# Patient Record
Sex: MALE | Race: WHITE | NOT HISPANIC OR LATINO | Employment: PART TIME | URBAN - METROPOLITAN AREA
[De-identification: names, ages, dates, MRNs, and addresses within clinical notes are randomized per-mention and may not be internally consistent; named-entity substitution may affect disease eponyms.]

---

## 2017-01-03 ENCOUNTER — LAB (OUTPATIENT)
Dept: LAB | Facility: HOSPITAL | Age: 22
End: 2017-01-03

## 2017-01-03 DIAGNOSIS — R53.83 FATIGUE, UNSPECIFIED TYPE: Primary | ICD-10-CM

## 2017-01-03 LAB
25(OH)D3 SERPL-MCNC: 11.4 NG/ML
ALBUMIN SERPL-MCNC: 4.9 G/DL (ref 3.2–4.8)
ALBUMIN/GLOB SERPL: 1.8 G/DL (ref 1.5–2.5)
ALP SERPL-CCNC: 64 U/L (ref 25–100)
ALT SERPL W P-5'-P-CCNC: 26 U/L (ref 7–40)
ANION GAP SERPL CALCULATED.3IONS-SCNC: 12 MMOL/L (ref 3–11)
AST SERPL-CCNC: 22 U/L (ref 0–33)
BASOPHILS # BLD MANUAL: 0.07 10*3/MM3 (ref 0–0.2)
BASOPHILS NFR BLD AUTO: 1 % (ref 0–1)
BILIRUB SERPL-MCNC: 0.6 MG/DL (ref 0.3–1.2)
BUN BLD-MCNC: 14 MG/DL (ref 9–23)
BUN/CREAT SERPL: 15.6 (ref 7–25)
CALCIUM SPEC-SCNC: 10.6 MG/DL (ref 8.7–10.4)
CHLORIDE SERPL-SCNC: 101 MMOL/L (ref 99–109)
CO2 SERPL-SCNC: 28 MMOL/L (ref 20–31)
CREAT BLD-MCNC: 0.9 MG/DL (ref 0.6–1.3)
DEPRECATED RDW RBC AUTO: 40.2 FL (ref 37–54)
EOSINOPHIL # BLD MANUAL: 0 10*3/MM3 (ref 0.1–0.3)
EOSINOPHIL NFR BLD MANUAL: 0 % (ref 0–3)
ERYTHROCYTE [DISTWIDTH] IN BLOOD BY AUTOMATED COUNT: 12.3 % (ref 11.3–14.5)
ERYTHROCYTE [SEDIMENTATION RATE] IN BLOOD: 2 MM/HR (ref 0–15)
GFR SERPL CREATININE-BSD FRML MDRD: 108 ML/MIN/1.73
GLOBULIN UR ELPH-MCNC: 2.7 GM/DL
GLUCOSE BLD-MCNC: 74 MG/DL (ref 70–100)
HCT VFR BLD AUTO: 49.9 % (ref 38.9–50.9)
HGB BLD-MCNC: 16.9 G/DL (ref 13.1–17.5)
LYMPHOCYTES # BLD MANUAL: 2.06 10*3/MM3 (ref 0.6–4.8)
LYMPHOCYTES NFR BLD MANUAL: 1 % (ref 0–12)
LYMPHOCYTES NFR BLD MANUAL: 28 % (ref 24–44)
MCH RBC QN AUTO: 30.3 PG (ref 27–31)
MCHC RBC AUTO-ENTMCNC: 33.9 G/DL (ref 32–36)
MCV RBC AUTO: 89.4 FL (ref 80–99)
MONOCYTES # BLD AUTO: 0.07 10*3/MM3 (ref 0–1)
NEUTROPHILS # BLD AUTO: 5.15 10*3/MM3 (ref 1.5–8.3)
NEUTROPHILS NFR BLD MANUAL: 67 % (ref 41–71)
NEUTS BAND NFR BLD MANUAL: 3 % (ref 0–5)
PLAT MORPH BLD: NORMAL
PLATELET # BLD AUTO: 220 10*3/MM3 (ref 150–450)
PMV BLD AUTO: 10.9 FL (ref 6–12)
POTASSIUM BLD-SCNC: 4.5 MMOL/L (ref 3.5–5.5)
PROT SERPL-MCNC: 7.6 G/DL (ref 5.7–8.2)
RBC # BLD AUTO: 5.58 10*6/MM3 (ref 4.2–5.76)
RBC MORPH BLD: NORMAL
SODIUM BLD-SCNC: 141 MMOL/L (ref 132–146)
T4 FREE SERPL-MCNC: 1.36 NG/DL (ref 0.89–1.76)
TSH SERPL DL<=0.05 MIU/L-ACNC: 1.59 MIU/ML (ref 0.35–5.35)
WBC MORPH BLD: NORMAL
WBC NRBC COR # BLD: 7.35 10*3/MM3 (ref 4.5–13.5)

## 2017-01-03 PROCEDURE — 84439 ASSAY OF FREE THYROXINE: CPT | Performed by: PEDIATRICS

## 2017-01-03 PROCEDURE — 85007 BL SMEAR W/DIFF WBC COUNT: CPT | Performed by: PEDIATRICS

## 2017-01-03 PROCEDURE — 82306 VITAMIN D 25 HYDROXY: CPT | Performed by: PEDIATRICS

## 2017-01-03 PROCEDURE — 85652 RBC SED RATE AUTOMATED: CPT | Performed by: PEDIATRICS

## 2017-01-03 PROCEDURE — 84443 ASSAY THYROID STIM HORMONE: CPT | Performed by: PEDIATRICS

## 2017-01-03 PROCEDURE — 80053 COMPREHEN METABOLIC PANEL: CPT | Performed by: PEDIATRICS

## 2017-01-03 PROCEDURE — 36415 COLL VENOUS BLD VENIPUNCTURE: CPT

## 2017-01-03 PROCEDURE — 85027 COMPLETE CBC AUTOMATED: CPT | Performed by: PEDIATRICS

## 2023-07-25 ENCOUNTER — NURSE TRIAGE (OUTPATIENT)
Dept: CALL CENTER | Facility: HOSPITAL | Age: 28
End: 2023-07-25
Payer: COMMERCIAL

## 2023-07-25 NOTE — TELEPHONE ENCOUNTER
HUB-He will be keeping his office visit time on Friday. He is in law school. He is not having any major problems If he gets headache worse Ha every had or vision changes need to be seen in the ER. He will be a new patient to the office.

## 2023-07-25 NOTE — TELEPHONE ENCOUNTER
HUB-He will be keeping his office visit time on Friday. He is in law school. He is not having any major problems If he gets headache worse Ha every had or vision changes need to be seen in the ER. He will be a new patient to the office.    Reason for Disposition   [1] Systolic BP  >= 130 OR Diastolic >= 80 AND [2] taking BP medications    Additional Information   Negative: Difficult to awaken or acting confused (e.g., disoriented, slurred speech)   Negative: SEVERE difficulty breathing (e.g., struggling for each breath, speaks in single words)   Negative: [1] Weakness of the face, arm or leg on one side of the body AND [2] new-onset   Negative: [1] Numbness (i.e., loss of sensation) of the face, arm or leg on one side of the body AND [2] new-onset   Negative: [1] Chest pain lasts > 5 minutes AND [2] history of heart disease (i.e., heart attack, bypass surgery, angina, angioplasty, CHF)   Negative: [1] Chest pain AND [2] took nitrogylcerin AND [3] pain was not relieved   Negative: Sounds like a life-threatening emergency to the triager   Negative: Symptom is main concern (e.g., headache, chest pain)   Negative: Low blood pressure is main concern   Negative: [1] Systolic BP  >= 160 OR Diastolic >= 100 AND [2] cardiac (e.g., breathing difficulty, chest pain) or neurologic symptoms (e.g., new-onset blurred or double vision, unsteady gait)   Negative: [1] Pregnant 20 or more weeks (or postpartum < 6 weeks) AND [2] new hand or face swelling   Negative: [1] Pregnant 20 or more weeks (or postpartum < 6 weeks) AND [2] Systolic BP >= 160 OR Diastolic >= 110   Negative: [1] Systolic BP  >= 200 OR Diastolic >= 120 AND [2] having NO cardiac or neurologic symptoms   Negative: [1] Pregnant 20 or more weeks (or postpartum < 6 weeks) AND [2] Systolic BP  >= 140 OR Diastolic >= 90   Negative: [1] Systolic BP  >= 180 OR Diastolic >= 110 AND [2] missed most recent dose of blood pressure medication   Negative: Systolic BP  >= 180 OR  Diastolic >= 110   Negative: Ran out of BP medications   Negative: Systolic BP  >= 160 OR Diastolic >= 100   Negative: [1] Taking BP medications AND [2] feels is having side effects (e.g., impotence, cough, dizzy upon standing)   Negative: [1] Systolic BP  >= 130 OR Diastolic >= 80 AND [2] pregnant   Negative: [1] Systolic BP  >= 130 OR Diastolic >= 80 AND [2] not taking BP medications    Protocols used: Blood Pressure - High-ADULT-

## 2023-07-28 ENCOUNTER — OFFICE VISIT (OUTPATIENT)
Dept: FAMILY MEDICINE CLINIC | Facility: CLINIC | Age: 28
End: 2023-07-28
Payer: COMMERCIAL

## 2023-07-28 VITALS
TEMPERATURE: 98.2 F | HEIGHT: 71 IN | HEART RATE: 78 BPM | SYSTOLIC BLOOD PRESSURE: 128 MMHG | WEIGHT: 237.4 LBS | DIASTOLIC BLOOD PRESSURE: 98 MMHG | BODY MASS INDEX: 33.23 KG/M2

## 2023-07-28 DIAGNOSIS — Z86.59 HISTORY OF OCD (OBSESSIVE COMPULSIVE DISORDER): ICD-10-CM

## 2023-07-28 DIAGNOSIS — N50.819 TESTICULAR DISCOMFORT: ICD-10-CM

## 2023-07-28 DIAGNOSIS — Z00.00 ENCOUNTER FOR ANNUAL PHYSICAL EXAM: Primary | ICD-10-CM

## 2023-07-28 DIAGNOSIS — F90.0 ATTENTION DEFICIT HYPERACTIVITY DISORDER, PREDOMINANTLY INATTENTIVE TYPE: ICD-10-CM

## 2023-07-28 DIAGNOSIS — Z83.3 FAMILY HISTORY OF DIABETES MELLITUS: ICD-10-CM

## 2023-07-28 PROBLEM — F41.9 ANXIETY DISORDER: Status: ACTIVE | Noted: 2017-08-08

## 2023-07-28 PROCEDURE — 1160F RVW MEDS BY RX/DR IN RCRD: CPT | Performed by: FAMILY MEDICINE

## 2023-07-28 PROCEDURE — 99385 PREV VISIT NEW AGE 18-39: CPT | Performed by: FAMILY MEDICINE

## 2023-07-28 PROCEDURE — 2014F MENTAL STATUS ASSESS: CPT | Performed by: FAMILY MEDICINE

## 2023-07-28 PROCEDURE — 3008F BODY MASS INDEX DOCD: CPT | Performed by: FAMILY MEDICINE

## 2023-07-28 PROCEDURE — 1159F MED LIST DOCD IN RCRD: CPT | Performed by: FAMILY MEDICINE

## 2023-07-28 NOTE — ASSESSMENT & PLAN NOTE
Annual wellness exam completed today. Health Maintenance including immunizations was updated and reflected in the chart. Yearly screening labs were ordered.     Further recommendations to be given once lab data received.     Health advice: healthy food choices with fresh fruits and vegetables, maintain sleep pattern at least 8 hours, avoid texting and distracted driving practices; wear safety belt, engage in regular exercise, maintain healthy weight, use safe sex practices, avoid alcohol and illicit drugs. Maintain immunizations that are up to date. Maintain health maintenance.  Follow up with PCP if struggling with depression or anxiety. Keep regular dental and eye exams. Brush and floss teeth daily.     F/U annually and prn.

## 2023-07-28 NOTE — ASSESSMENT & PLAN NOTE
Psychological condition is  stable .  Patient is currently not in school at this time, I will have patient follow-up in 3 weeks to recheck his blood pressure.  If his blood pressure is still stable I will restart him on a stimulant  Psychological condition  will be reassessed  3 weeks .

## 2023-07-28 NOTE — PROGRESS NOTES
Subjective     Chief Complaint  Hypertension (Est care/BP has been running high for approx 1 yr/Dm is in family would like to be tested/Found a lump on L testicle)    Subjective          Yakelin Enrique is a 28 y.o. male who presents today to [facility] for initial evaluation .    HPI:   Hypertension    Testicle Pain  The patient's primary symptoms include testicular pain. This is a new problem. Episode onset: 6 months. The problem occurs intermittently. The problem has been waxing and waning. The patient is experiencing no pain.     Ms. Enrique is a very pleasant 28 year old male who presents today to establish care with a primary care physician and annual physical exam.     He has a history of hypertension, that has been ongoing for the last one year. He has been trying to live a healthier lifestyle.  He reports that he has been running and exercising more and has noticed a 10 pound weight loss.  Blood pressure in office today is well controlled at 128/98.  He also reports concern as he has a family history of type 2 diabetes.    Otherwise his past medical history includes ADHD and OCD.  He is not currently taking any medications for these as he wished to taper off of sertraline as he felt like this was causing some weight gain.  He is doing moderately well without medication.  His ADHD medicine was discontinued due to elevation in his blood pressure.  He reports that after he was taken off of his stimulant he noticed a decline in his grades.  He is currently in law school.      The following portions of the patient's history were reviewed and updated as appropriate: allergies, current medications, past family history, past medical history, past social history, past surgical history and problem list.    Objective     Objective     Allergy:   Allergies   Allergen Reactions    Sulfa Antibiotics         Current Medications:   No current outpatient medications on file.     No  "current facility-administered medications for this visit.       Past Medical History:  Past Medical History:   Diagnosis Date    ADHD     OCD (obsessive compulsive disorder)        Past Surgical History:  Past Surgical History:   Procedure Laterality Date    CLOSED REDUCTION ELBOW DISLOCATION Bilateral     DENTAL PROCEDURE         Social History:  Social History     Socioeconomic History    Marital status: Single   Tobacco Use    Smoking status: Never     Passive exposure: Never    Smokeless tobacco: Never   Vaping Use    Vaping Use: Never used   Substance and Sexual Activity    Alcohol use: Yes     Comment: socially    Drug use: Defer    Sexual activity: Defer       Family History:  Family History   Problem Relation Age of Onset    Diabetes Mother     Diabetes Brother     Diabetes Maternal Aunt     Diabetes Cousin        Vital Signs:   /98   Pulse 78   Temp 98.2 °F (36.8 °C) (Infrared)   Ht 179.1 cm (70.51\")   Wt 108 kg (237 lb 6.4 oz)   BMI 33.57 kg/m²      Physical Exam:  Physical Exam  Constitutional:       Appearance: He is normal weight.   HENT:      Head: Normocephalic.   Cardiovascular:      Rate and Rhythm: Normal rate and regular rhythm.      Pulses: Normal pulses.      Heart sounds: No murmur heard.  Pulmonary:      Effort: Pulmonary effort is normal. No respiratory distress.      Breath sounds: No wheezing.   Abdominal:      General: Abdomen is flat. Bowel sounds are normal.   Musculoskeletal:         General: No swelling or tenderness.      Cervical back: Normal range of motion.   Neurological:      General: No focal deficit present.      Mental Status: He is alert. Mental status is at baseline.   Psychiatric:         Mood and Affect: Mood normal.         Behavior: Behavior normal.         Thought Content: Thought content normal.         Judgment: Judgment normal.             PHQ-9 Score  PHQ-9 Total Score: 0     Lab Review  No visits with results within 3 Month(s) from this visit.   Latest " known visit with results is:   Lab on 01/03/2017   Component Date Value Ref Range Status    Glucose 01/03/2017 74  70 - 100 mg/dL Final    BUN 01/03/2017 14  9 - 23 mg/dL Final    Creatinine 01/03/2017 0.90  0.60 - 1.30 mg/dL Final    Sodium 01/03/2017 141  132 - 146 mmol/L Final    Potassium 01/03/2017 4.5  3.5 - 5.5 mmol/L Final    Chloride 01/03/2017 101  99 - 109 mmol/L Final    CO2 01/03/2017 28.0  20.0 - 31.0 mmol/L Final    Calcium 01/03/2017 10.6 (H)  8.7 - 10.4 mg/dL Final    Total Protein 01/03/2017 7.6  5.7 - 8.2 g/dL Final    Albumin 01/03/2017 4.90 (H)  3.20 - 4.80 g/dL Final    ALT (SGPT) 01/03/2017 26  7 - 40 U/L Final    AST (SGOT) 01/03/2017 22  0 - 33 U/L Final    Alkaline Phosphatase 01/03/2017 64  25 - 100 U/L Final    Total Bilirubin 01/03/2017 0.6  0.3 - 1.2 mg/dL Final    eGFR Non African Amer 01/03/2017 108  >60 mL/min/1.73 Final    Globulin 01/03/2017 2.7  gm/dL Final    A/G Ratio 01/03/2017 1.8  1.5 - 2.5 g/dL Final    BUN/Creatinine Ratio 01/03/2017 15.6  7.0 - 25.0 Final    Anion Gap 01/03/2017 12.0 (H)  3.0 - 11.0 mmol/L Final    Sed Rate 01/03/2017 2  0 - 15 mm/hr Final    25 Hydroxy, Vitamin D 01/03/2017 11.4  ng/ml Final    TSH 01/03/2017 1.589  0.350 - 5.350 mIU/mL Final    Free T4 01/03/2017 1.36  0.89 - 1.76 ng/dL Final    WBC 01/03/2017 7.35  4.50 - 13.50 10*3/mm3 Final    RBC 01/03/2017 5.58  4.20 - 5.76 10*6/mm3 Final    Hemoglobin 01/03/2017 16.9  13.1 - 17.5 g/dL Final    Hematocrit 01/03/2017 49.9  38.9 - 50.9 % Final    MCV 01/03/2017 89.4  80.0 - 99.0 fL Final    MCH 01/03/2017 30.3  27.0 - 31.0 pg Final    MCHC 01/03/2017 33.9  32.0 - 36.0 g/dL Final    RDW 01/03/2017 12.3  11.3 - 14.5 % Final    RDW-SD 01/03/2017 40.2  37.0 - 54.0 fl Final    MPV 01/03/2017 10.9  6.0 - 12.0 fL Final    Platelets 01/03/2017 220  150 - 450 10*3/mm3 Final    Neutrophil % 01/03/2017 67.0  41.0 - 71.0 % Final    Lymphocyte % 01/03/2017 28.0  24.0 - 44.0 % Final    Monocyte % 01/03/2017 1.0   0.0 - 12.0 % Final    Eosinophil % 01/03/2017 0.0  0.0 - 3.0 % Final    Basophil % 01/03/2017 1.0  0.0 - 1.0 % Final    Bands %  01/03/2017 3.0  0.0 - 5.0 % Final    Neutrophils Absolute 01/03/2017 5.15  1.50 - 8.30 10*3/mm3 Final    Lymphocytes Absolute 01/03/2017 2.06  0.60 - 4.80 10*3/mm3 Final    Monocytes Absolute 01/03/2017 0.07  0.00 - 1.00 10*3/mm3 Final    Eosinophils Absolute 01/03/2017 0.00 (L)  0.10 - 0.30 10*3/mm3 Final    Basophils Absolute 01/03/2017 0.07  0.00 - 0.20 10*3/mm3 Final    RBC Morphology 01/03/2017 Normal  Normal Final    WBC Morphology 01/03/2017 Normal  Normal Final    Platelet Morphology 01/03/2017 Normal  Normal Final        Radiology Results        Assessment / Plan         Assessment and Plan   Diagnoses and all orders for this visit:    1. Encounter for annual physical exam (Primary)  Assessment & Plan:  Annual wellness exam completed today. Health Maintenance including immunizations was updated and reflected in the chart. Yearly screening labs were ordered.     Further recommendations to be given once lab data received.     Health advice: healthy food choices with fresh fruits and vegetables, maintain sleep pattern at least 8 hours, avoid texting and distracted driving practices; wear safety belt, engage in regular exercise, maintain healthy weight, use safe sex practices, avoid alcohol and illicit drugs. Maintain immunizations that are up to date. Maintain health maintenance.  Follow up with PCP if struggling with depression or anxiety. Keep regular dental and eye exams. Brush and floss teeth daily.     F/U annually and prn.       Orders:  -     CBC & Differential; Future  -     Comprehensive Metabolic Panel; Future  -     Lipid Panel; Future  -     Hemoglobin A1c; Future  -     TSH Rfx On Abnormal To Free T4; Future    2. Family history of diabetes mellitus  -     Hemoglobin A1c; Future    3. Attention deficit hyperactivity disorder, predominantly inattentive type  Assessment &  Plan:  Psychological condition is  stable .  Patient is currently not in school at this time, I will have patient follow-up in 3 weeks to recheck his blood pressure.  If his blood pressure is still stable I will restart him on a stimulant  Psychological condition  will be reassessed  3 weeks .      4. History of OCD (obsessive compulsive disorder)  Assessment & Plan:  As in HPI he is controlling his symptoms with behavioral therapies      5. Testicular discomfort  Assessment & Plan:  Completely benign exam performed by my partner, Dr. Walsh.  I appreciate his assistance in this.         Discussed possible differential diagnoses, testing, treatment, recommended non-pharmacological interventions, risks, warning signs to monitor for that would indicate need for follow-up in clinic or ER. If no improvement with these regimens or you have new or worsening symptoms follow-up. Patient verbalizes understanding and agreement with plan of care. Denies further needs or concerns.     Patient was given instructions and counseling regarding her condition and for health maintenance advised.    BMI is >= 30 and <35. (Class 1 Obesity). The following options were offered after discussion;: weight loss educational material (shared in after visit summary)         Health Maintenance  Health Maintenance:   Health Maintenance Due   Topic Date Due    HEPATITIS C SCREENING  Never done        Meds ordered during this visit  No orders of the defined types were placed in this encounter.      Meds stopped during this visit:  Medications Discontinued During This Encounter   Medication Reason    azithromycin (ZITHROMAX) 250 MG tablet *Therapy completed        Visit Diagnoses    ICD-10-CM ICD-9-CM   1. Encounter for annual physical exam  Z00.00 V70.0   2. Family history of diabetes mellitus  Z83.3 V18.0   3. Attention deficit hyperactivity disorder, predominantly inattentive type  F90.0 314.00   4. History of OCD (obsessive compulsive disorder)   Z86.59 V11.2   5. Testicular discomfort  N50.819 608.9       Patient was given instructions and counseling regarding his condition or for health maintenance advice. Please see specific information pulled into the AVS if appropriate.     Follow Up   Return in about 3 weeks (around 8/18/2023) for Recheck.          This document has been electronically signed by Marija Corral DO   July 28, 2023 14:25 EDT    Dictated Utilizing Dragon Dictation: Part of this note may be an electronic transcription/translation of spoken language to printed text using the Dragon Dictation System.    Marija Corral D.O.  [facility] [place]     Independent exam   I independently examined this patient for testicular concern.  He has intermittent bump to the left testicle over the past 6 months not present today.  On physical exam he has normal smooth round testicle without mass bilaterally.  Epididymis normal bilaterally and nontender.    Normal reassuring testicular exam.  Follow-up for any new changes    River Walsh MD  Family Medicine - [place] [facility]

## 2023-08-08 ENCOUNTER — LAB (OUTPATIENT)
Dept: LAB | Facility: HOSPITAL | Age: 28
End: 2023-08-08
Payer: COMMERCIAL

## 2023-08-08 DIAGNOSIS — Z83.3 FAMILY HISTORY OF DIABETES MELLITUS: ICD-10-CM

## 2023-08-08 DIAGNOSIS — Z00.00 ENCOUNTER FOR ANNUAL PHYSICAL EXAM: ICD-10-CM

## 2023-08-08 LAB
ALBUMIN SERPL-MCNC: 5.2 G/DL (ref 3.5–5.2)
ALBUMIN/GLOB SERPL: 2.2 G/DL
ALP SERPL-CCNC: 68 U/L (ref 39–117)
ALT SERPL W P-5'-P-CCNC: 40 U/L (ref 1–41)
ANION GAP SERPL CALCULATED.3IONS-SCNC: 13 MMOL/L (ref 5–15)
AST SERPL-CCNC: 33 U/L (ref 1–40)
BASOPHILS # BLD AUTO: 0.05 10*3/MM3 (ref 0–0.2)
BASOPHILS NFR BLD AUTO: 0.8 % (ref 0–1.5)
BILIRUB SERPL-MCNC: 0.4 MG/DL (ref 0–1.2)
BUN SERPL-MCNC: 11 MG/DL (ref 6–20)
BUN/CREAT SERPL: 12.2 (ref 7–25)
CALCIUM SPEC-SCNC: 9.6 MG/DL (ref 8.6–10.5)
CHLORIDE SERPL-SCNC: 102 MMOL/L (ref 98–107)
CHOLEST SERPL-MCNC: 186 MG/DL (ref 0–200)
CO2 SERPL-SCNC: 23 MMOL/L (ref 22–29)
CREAT SERPL-MCNC: 0.9 MG/DL (ref 0.76–1.27)
DEPRECATED RDW RBC AUTO: 38.3 FL (ref 37–54)
EGFRCR SERPLBLD CKD-EPI 2021: 120 ML/MIN/1.73
EOSINOPHIL # BLD AUTO: 0.08 10*3/MM3 (ref 0–0.4)
EOSINOPHIL NFR BLD AUTO: 1.3 % (ref 0.3–6.2)
ERYTHROCYTE [DISTWIDTH] IN BLOOD BY AUTOMATED COUNT: 12.1 % (ref 12.3–15.4)
GLOBULIN UR ELPH-MCNC: 2.4 GM/DL
GLUCOSE SERPL-MCNC: 88 MG/DL (ref 65–99)
HBA1C MFR BLD: 5.3 % (ref 4.8–5.6)
HCT VFR BLD AUTO: 48.1 % (ref 37.5–51)
HDLC SERPL-MCNC: 40 MG/DL (ref 40–60)
HGB BLD-MCNC: 16.6 G/DL (ref 13–17.7)
IMM GRANULOCYTES # BLD AUTO: 0.01 10*3/MM3 (ref 0–0.05)
IMM GRANULOCYTES NFR BLD AUTO: 0.2 % (ref 0–0.5)
LDLC SERPL CALC-MCNC: 120 MG/DL (ref 0–100)
LDLC/HDLC SERPL: 2.94 {RATIO}
LYMPHOCYTES # BLD AUTO: 1.64 10*3/MM3 (ref 0.7–3.1)
LYMPHOCYTES NFR BLD AUTO: 26.4 % (ref 19.6–45.3)
MCH RBC QN AUTO: 29.9 PG (ref 26.6–33)
MCHC RBC AUTO-ENTMCNC: 34.5 G/DL (ref 31.5–35.7)
MCV RBC AUTO: 86.7 FL (ref 79–97)
MONOCYTES # BLD AUTO: 0.45 10*3/MM3 (ref 0.1–0.9)
MONOCYTES NFR BLD AUTO: 7.2 % (ref 5–12)
NEUTROPHILS NFR BLD AUTO: 3.98 10*3/MM3 (ref 1.7–7)
NEUTROPHILS NFR BLD AUTO: 64.1 % (ref 42.7–76)
NRBC BLD AUTO-RTO: 0 /100 WBC (ref 0–0.2)
PLATELET # BLD AUTO: 266 10*3/MM3 (ref 140–450)
PMV BLD AUTO: 10.9 FL (ref 6–12)
POTASSIUM SERPL-SCNC: 4.3 MMOL/L (ref 3.5–5.2)
PROT SERPL-MCNC: 7.6 G/DL (ref 6–8.5)
RBC # BLD AUTO: 5.55 10*6/MM3 (ref 4.14–5.8)
SODIUM SERPL-SCNC: 138 MMOL/L (ref 136–145)
TRIGL SERPL-MCNC: 143 MG/DL (ref 0–150)
TSH SERPL DL<=0.05 MIU/L-ACNC: 1.14 UIU/ML (ref 0.27–4.2)
VLDLC SERPL-MCNC: 26 MG/DL (ref 5–40)
WBC NRBC COR # BLD: 6.21 10*3/MM3 (ref 3.4–10.8)

## 2023-08-08 PROCEDURE — 85025 COMPLETE CBC W/AUTO DIFF WBC: CPT

## 2023-08-08 PROCEDURE — 83036 HEMOGLOBIN GLYCOSYLATED A1C: CPT

## 2023-08-08 PROCEDURE — 80061 LIPID PANEL: CPT

## 2023-08-08 PROCEDURE — 80053 COMPREHEN METABOLIC PANEL: CPT

## 2023-08-08 PROCEDURE — 84443 ASSAY THYROID STIM HORMONE: CPT

## 2023-08-17 ENCOUNTER — OFFICE VISIT (OUTPATIENT)
Dept: FAMILY MEDICINE CLINIC | Facility: CLINIC | Age: 28
End: 2023-08-17
Payer: COMMERCIAL

## 2023-08-17 VITALS
WEIGHT: 236 LBS | BODY MASS INDEX: 33.04 KG/M2 | HEART RATE: 83 BPM | OXYGEN SATURATION: 98 % | DIASTOLIC BLOOD PRESSURE: 72 MMHG | SYSTOLIC BLOOD PRESSURE: 124 MMHG | HEIGHT: 71 IN | TEMPERATURE: 98.6 F

## 2023-08-17 DIAGNOSIS — F90.0 ATTENTION DEFICIT HYPERACTIVITY DISORDER, PREDOMINANTLY INATTENTIVE TYPE: Primary | ICD-10-CM

## 2023-08-17 PROCEDURE — 1160F RVW MEDS BY RX/DR IN RCRD: CPT | Performed by: FAMILY MEDICINE

## 2023-08-17 PROCEDURE — 1159F MED LIST DOCD IN RCRD: CPT | Performed by: FAMILY MEDICINE

## 2023-08-17 PROCEDURE — 99214 OFFICE O/P EST MOD 30 MIN: CPT | Performed by: FAMILY MEDICINE

## 2023-08-17 RX ORDER — DEXTROAMPHETAMINE SACCHARATE, AMPHETAMINE ASPARTATE, DEXTROAMPHETAMINE SULFATE AND AMPHETAMINE SULFATE 1.25; 1.25; 1.25; 1.25 MG/1; MG/1; MG/1; MG/1
5 TABLET ORAL DAILY
Qty: 30 TABLET | Refills: 0 | Status: SHIPPED | OUTPATIENT
Start: 2023-08-17

## 2023-08-17 NOTE — ASSESSMENT & PLAN NOTE
Psychological condition is unchanged.  Medication changes per orders.  Psychological condition  will be reassessed in 3 months.    The patient is taking the following controlled substance(s) on a long term (greater than three months) basis: Amphetamine/dextroamphetamine (Adderall).  He is taking controlled substances for other (ADHD).  A history was obtained from the patient and the following were reviewed: family history, social history, psychiatric history, and substance abuse history.  A baseline assessment was performed and includes a controlled substance agreement, urine drug screen, ROSEMARY (within 12 months prior to today's encounter), and a physical exam, if appropriate, was performed within the past year.  It is medically appropriate to prescribe him the medication(s) above.  If applicable, prior treatment records were obtained and reviewed to justify long term prescribing of controlled substances.  The patient was educated on the following: Limited use of the medication, need to discontinue the medication when his condition resolves, proper storage and disposal of medication(s), and risks and dangers associated with controlled substances including tolerance, dependence, and addiction.  A written informed consent was obtained and includes objectives of treatment, further diagnostic testing if appropriate, and an exit strategy for discontinuing the medication on the condition resolves, if appropriate.  In addition, if appropriate, the patient will be screened for other medical conditions that may impact the prescribing of controlled substances.  Previous treatments have been tried including trials of noncontrolled substances or lower doses of controlled substances.  The controlled medication(s) have been titrated to the level appropriate and necessary and the medication(s) are not causing unacceptable side effects.

## 2023-08-17 NOTE — PROGRESS NOTES
Subjective     Chief Complaint  Testicular discomfort (F/u) and review medication  (Would like to start medication for going back to school )    Subjective          Yakelin Enrique is a 28 y.o. male who presents today to [facility] for initial evaluation .    HPI:       Ms. Enrique is a very pleasant 28 year old male who presents today to follow up on ADHD.     He has a history of hypertension, that has been ongoing for the last one year. He has been trying to live a healthier lifestyle.  He reports that he has been running and exercising more and has noticed a 10 pound weight loss.  Blood pressure in office today is well controlled at 124/72.    Testicular pain from last visit has resolved.    Otherwise his past medical history includes ADHD and OCD.  He is not currently taking any medications for these as he wished to taper off of sertraline as he felt like this was causing some weight gain.  He is doing moderately well without medication.  His ADHD medicine was discontinued due to elevation in his blood pressure.  He reports that after he was taken off of his stimulant he noticed a decline in his grades.  He is currently in law school.  Blood pressure continues to be well controlled.  He has made significant changes in his lifestyle.  About restarting Adderall prior to returning to school.    The following portions of the patient's history were reviewed and updated as appropriate: allergies, current medications, past family history, past medical history, past social history, past surgical history and problem list.    Objective     Objective     Allergy:   Allergies   Allergen Reactions    Sulfa Antibiotics         Current Medications:   Current Outpatient Medications   Medication Sig Dispense Refill    amphetamine-dextroamphetamine (Adderall) 5 MG tablet Take 1 tablet by mouth Daily. 30 tablet 0     No current facility-administered medications for this visit.       Past  "Medical History:  Past Medical History:   Diagnosis Date    ADHD     OCD (obsessive compulsive disorder)        Past Surgical History:  Past Surgical History:   Procedure Laterality Date    CLOSED REDUCTION ELBOW DISLOCATION Bilateral     DENTAL PROCEDURE         Social History:  Social History     Socioeconomic History    Marital status: Single   Tobacco Use    Smoking status: Never     Passive exposure: Never    Smokeless tobacco: Never   Vaping Use    Vaping Use: Never used   Substance and Sexual Activity    Alcohol use: Yes     Comment: socially    Drug use: Defer    Sexual activity: Defer       Family History:  Family History   Problem Relation Age of Onset    Diabetes Mother     Diabetes Brother     Diabetes Maternal Aunt     Diabetes Cousin        Vital Signs:   /72 (BP Location: Left arm, Patient Position: Sitting, Cuff Size: Adult)   Pulse 83   Temp 98.6 øF (37 øC)   Ht 180.3 cm (71\")   Wt 107 kg (236 lb)   SpO2 98%   BMI 32.92 kg/mý      Physical Exam:  Physical Exam  Constitutional:       Appearance: He is normal weight.   HENT:      Head: Normocephalic.   Cardiovascular:      Rate and Rhythm: Normal rate and regular rhythm.      Pulses: Normal pulses.      Heart sounds: No murmur heard.  Pulmonary:      Effort: Pulmonary effort is normal. No respiratory distress.      Breath sounds: No wheezing.   Abdominal:      General: Abdomen is flat. Bowel sounds are normal.   Musculoskeletal:         General: No swelling or tenderness.      Cervical back: Normal range of motion.   Neurological:      General: No focal deficit present.      Mental Status: He is alert. Mental status is at baseline.   Psychiatric:         Mood and Affect: Mood normal.         Behavior: Behavior normal.         Thought Content: Thought content normal.         Judgment: Judgment normal.             PHQ-9 Score  PHQ-9 Total Score:       Lab Review  Lab on 08/08/2023   Component Date Value Ref Range Status    Glucose 08/08/2023 " 88  65 - 99 mg/dL Final    BUN 08/08/2023 11  6 - 20 mg/dL Final    Creatinine 08/08/2023 0.90  0.76 - 1.27 mg/dL Final    Sodium 08/08/2023 138  136 - 145 mmol/L Final    Potassium 08/08/2023 4.3  3.5 - 5.2 mmol/L Final    Chloride 08/08/2023 102  98 - 107 mmol/L Final    CO2 08/08/2023 23.0  22.0 - 29.0 mmol/L Final    Calcium 08/08/2023 9.6  8.6 - 10.5 mg/dL Final    Total Protein 08/08/2023 7.6  6.0 - 8.5 g/dL Final    Albumin 08/08/2023 5.2  3.5 - 5.2 g/dL Final    ALT (SGPT) 08/08/2023 40  1 - 41 U/L Final    AST (SGOT) 08/08/2023 33  1 - 40 U/L Final    Alkaline Phosphatase 08/08/2023 68  39 - 117 U/L Final    Total Bilirubin 08/08/2023 0.4  0.0 - 1.2 mg/dL Final    Globulin 08/08/2023 2.4  gm/dL Final    A/G Ratio 08/08/2023 2.2  g/dL Final    BUN/Creatinine Ratio 08/08/2023 12.2  7.0 - 25.0 Final    Anion Gap 08/08/2023 13.0  5.0 - 15.0 mmol/L Final    eGFR 08/08/2023 120.0  >60.0 mL/min/1.73 Final    Total Cholesterol 08/08/2023 186  0 - 200 mg/dL Final    Triglycerides 08/08/2023 143  0 - 150 mg/dL Final    HDL Cholesterol 08/08/2023 40  40 - 60 mg/dL Final    LDL Cholesterol  08/08/2023 120 (H)  0 - 100 mg/dL Final    VLDL Cholesterol 08/08/2023 26  5 - 40 mg/dL Final    LDL/HDL Ratio 08/08/2023 2.94   Final    Hemoglobin A1C 08/08/2023 5.30  4.80 - 5.60 % Final    TSH 08/08/2023 1.140  0.270 - 4.200 uIU/mL Final    WBC 08/08/2023 6.21  3.40 - 10.80 10*3/mm3 Final    RBC 08/08/2023 5.55  4.14 - 5.80 10*6/mm3 Final    Hemoglobin 08/08/2023 16.6  13.0 - 17.7 g/dL Final    Hematocrit 08/08/2023 48.1  37.5 - 51.0 % Final    MCV 08/08/2023 86.7  79.0 - 97.0 fL Final    MCH 08/08/2023 29.9  26.6 - 33.0 pg Final    MCHC 08/08/2023 34.5  31.5 - 35.7 g/dL Final    RDW 08/08/2023 12.1 (L)  12.3 - 15.4 % Final    RDW-SD 08/08/2023 38.3  37.0 - 54.0 fl Final    MPV 08/08/2023 10.9  6.0 - 12.0 fL Final    Platelets 08/08/2023 266  140 - 450 10*3/mm3 Final    Neutrophil % 08/08/2023 64.1  42.7 - 76.0 % Final     Lymphocyte % 08/08/2023 26.4  19.6 - 45.3 % Final    Monocyte % 08/08/2023 7.2  5.0 - 12.0 % Final    Eosinophil % 08/08/2023 1.3  0.3 - 6.2 % Final    Basophil % 08/08/2023 0.8  0.0 - 1.5 % Final    Immature Grans % 08/08/2023 0.2  0.0 - 0.5 % Final    Neutrophils, Absolute 08/08/2023 3.98  1.70 - 7.00 10*3/mm3 Final    Lymphocytes, Absolute 08/08/2023 1.64  0.70 - 3.10 10*3/mm3 Final    Monocytes, Absolute 08/08/2023 0.45  0.10 - 0.90 10*3/mm3 Final    Eosinophils, Absolute 08/08/2023 0.08  0.00 - 0.40 10*3/mm3 Final    Basophils, Absolute 08/08/2023 0.05  0.00 - 0.20 10*3/mm3 Final    Immature Grans, Absolute 08/08/2023 0.01  0.00 - 0.05 10*3/mm3 Final    nRBC 08/08/2023 0.0  0.0 - 0.2 /100 WBC Final        Radiology Results        Assessment / Plan         Assessment and Plan   Diagnoses and all orders for this visit:    1. Attention deficit hyperactivity disorder, predominantly inattentive type (Primary)  Assessment & Plan:  Psychological condition is unchanged.  Medication changes per orders.  Psychological condition  will be reassessed in 3 months.    The patient is taking the following controlled substance(s) on a long term (greater than three months) basis: Amphetamine/dextroamphetamine (Adderall).  He is taking controlled substances for other (ADHD).  A history was obtained from the patient and the following were reviewed: family history, social history, psychiatric history, and substance abuse history.  A baseline assessment was performed and includes a controlled substance agreement, urine drug screen, ROSEMARY (within 12 months prior to today's encounter), and a physical exam, if appropriate, was performed within the past year.  It is medically appropriate to prescribe him the medication(s) above.  If applicable, prior treatment records were obtained and reviewed to justify long term prescribing of controlled substances.  The patient was educated on the following: Limited use of the medication, need to  discontinue the medication when his condition resolves, proper storage and disposal of medication(s), and risks and dangers associated with controlled substances including tolerance, dependence, and addiction.  A written informed consent was obtained and includes objectives of treatment, further diagnostic testing if appropriate, and an exit strategy for discontinuing the medication on the condition resolves, if appropriate.  In addition, if appropriate, the patient will be screened for other medical conditions that may impact the prescribing of controlled substances.  Previous treatments have been tried including trials of noncontrolled substances or lower doses of controlled substances.  The controlled medication(s) have been titrated to the level appropriate and necessary and the medication(s) are not causing unacceptable side effects.        Orders:  -     Compliance Drug Analysis, Ur - Urine, Clean Catch; Future  -     amphetamine-dextroamphetamine (Adderall) 5 MG tablet; Take 1 tablet by mouth Daily.  Dispense: 30 tablet; Refill: 0      Discussed possible differential diagnoses, testing, treatment, recommended non-pharmacological interventions, risks, warning signs to monitor for that would indicate need for follow-up in clinic or ER. If no improvement with these regimens or you have new or worsening symptoms follow-up. Patient verbalizes understanding and agreement with plan of care. Denies further needs or concerns.     Patient was given instructions and counseling regarding her condition and for health maintenance advised.    BMI is >= 30 and <35. (Class 1 Obesity). The following options were offered after discussion;: weight loss educational material (shared in after visit summary)         Health Maintenance  Health Maintenance:   Health Maintenance Due   Topic Date Due    HEPATITIS C SCREENING  Never done        Meds ordered during this visit  New Medications Ordered This Visit   Medications     amphetamine-dextroamphetamine (Adderall) 5 MG tablet     Sig: Take 1 tablet by mouth Daily.     Dispense:  30 tablet     Refill:  0       Meds stopped during this visit:  There are no discontinued medications.       Visit Diagnoses    ICD-10-CM ICD-9-CM   1. Attention deficit hyperactivity disorder, predominantly inattentive type  F90.0 314.00       Patient was given instructions and counseling regarding his condition or for health maintenance advice. Please see specific information pulled into the AVS if appropriate.     Follow Up   Return in about 3 months (around 11/17/2023) for Recheck.      This document has been electronically signed by Marija Corral DO   August 17, 2023 15:12 EDT    Dictated Utilizing Dragon Dictation: Part of this note may be an electronic transcription/translation of spoken language to printed text using the Dragon Dictation System.    Marija Corral D.O.  [facility]

## 2023-08-24 ENCOUNTER — PRIOR AUTHORIZATION (OUTPATIENT)
Dept: FAMILY MEDICINE CLINIC | Facility: CLINIC | Age: 28
End: 2023-08-24
Payer: COMMERCIAL

## 2023-08-24 ENCOUNTER — TELEPHONE (OUTPATIENT)
Dept: FAMILY MEDICINE CLINIC | Facility: CLINIC | Age: 28
End: 2023-08-24
Payer: COMMERCIAL

## 2023-08-28 LAB — DRUGS UR: NORMAL

## 2023-08-30 ENCOUNTER — PRIOR AUTHORIZATION (OUTPATIENT)
Dept: FAMILY MEDICINE CLINIC | Facility: CLINIC | Age: 28
End: 2023-08-30
Payer: COMMERCIAL

## 2023-08-30 NOTE — TELEPHONE ENCOUNTER
9/6/23    Member has an active PA on file which is expiring on 08/23/2024.        8/30/23    PA started and sent to [address]: [id number] - Rx #: [id number] help? Call us at [phone]  Status  Sent to [facility]  Drug  Amphetamine-Dextroamphetamine 5MG tablets

## 2023-09-25 DIAGNOSIS — F90.0 ATTENTION DEFICIT HYPERACTIVITY DISORDER, PREDOMINANTLY INATTENTIVE TYPE: ICD-10-CM

## 2023-09-25 RX ORDER — DEXTROAMPHETAMINE SACCHARATE, AMPHETAMINE ASPARTATE, DEXTROAMPHETAMINE SULFATE AND AMPHETAMINE SULFATE 1.25; 1.25; 1.25; 1.25 MG/1; MG/1; MG/1; MG/1
5 TABLET ORAL DAILY
Qty: 30 TABLET | Refills: 0 | Status: SHIPPED | OUTPATIENT
Start: 2023-09-25

## 2023-09-25 NOTE — TELEPHONE ENCOUNTER
Caller: Yakelin Enrique    Relationship: Self    Best call back number: [phone]     Requested Prescriptions:   Requested Prescriptions     Pending Prescriptions Disp Refills    amphetamine-dextroamphetamine (Adderall) 5 MG tablet 30 tablet 0     Sig: Take 1 tablet by mouth Daily.        Pharmacy where request should be sent[id number] [phone] [facility] [phone]      Last office visit with prescribing clinician: 8/17/2023   Last telemedicine visit with prescribing clinician: Visit date not found   Next office visit with prescribing clinician: 11/17/2023     Additional details provided by patient: HE WAS SUPPOSED TO RECEIVE THREE REFILLS BUT DID NOT. IF HE NEEDS TO BE SEEN PLEASE GIVE HIM A HEADS UP SO HE CAN SCHEDULE IT.    Does the patient have less than a 3 day supply:  [] Yes  [x] No    Would you like a call back once the refill request has been completed: [] Yes [x] No    If the office needs to give you a call back, can they leave a voicemail: [] Yes [x] No    Nadia Reyes, PCT   09/25/23 10:58 EDT

## 2023-10-27 DIAGNOSIS — F90.0 ATTENTION DEFICIT HYPERACTIVITY DISORDER, PREDOMINANTLY INATTENTIVE TYPE: ICD-10-CM

## 2023-10-27 RX ORDER — DEXTROAMPHETAMINE SACCHARATE, AMPHETAMINE ASPARTATE, DEXTROAMPHETAMINE SULFATE AND AMPHETAMINE SULFATE 1.25; 1.25; 1.25; 1.25 MG/1; MG/1; MG/1; MG/1
5 TABLET ORAL DAILY
Qty: 30 TABLET | Refills: 0 | Status: SHIPPED | OUTPATIENT
Start: 2023-10-27

## 2023-10-27 NOTE — TELEPHONE ENCOUNTER
Caller: Yakelin Enrique    Relationship: Self    Best call back number: [phone]    Requested Prescriptions:   Requested Prescriptions     Pending Prescriptions Disp Refills    amphetamine-dextroamphetamine (Adderall) 5 MG tablet 30 tablet 0     Sig: Take 1 tablet by mouth Daily.        Pharmacy where request should be sent: [facility]/PHARMACY #[id number] - [address] [phone] [facility] [phone] FX     Last office visit with prescribing clinician: 8/17/2023   Last telemedicine visit with prescribing clinician: Visit date not found   Next office visit with prescribing clinician: 11/17/2023     Additional details provided by patient: PATIENT ONLY HAS TWO PILLS LEFT    Does the patient have less than a 3 day supply:  [x] Yes  [] No    Would you like a call back once the refill request has been completed: [x] Yes [] No    If the office needs to give you a call back, can they leave a voicemail: [x] Yes [] No    Haritha Vidales Rep   10/27/23 10:20 EDT

## 2023-10-27 NOTE — TELEPHONE ENCOUNTER
Rx Refill Note  Requested Prescriptions     Pending Prescriptions Disp Refills    amphetamine-dextroamphetamine (Adderall) 5 MG tablet 30 tablet 0     Sig: Take 1 tablet by mouth Daily.      Last office visit with prescribing clinician: 8/17/2023   Last telemedicine visit with prescribing clinician: Visit date not found   Next office visit with prescribing clinician: 11/17/2023                         Would you like a call back once the refill request has been completed: [] Yes [] No    If the office needs to give you a call back, can they leave a voicemail: [] Yes [] No    Tram Diana MA  10/27/23, 10:22 EDT

## 2023-11-17 ENCOUNTER — OFFICE VISIT (OUTPATIENT)
Dept: FAMILY MEDICINE CLINIC | Facility: CLINIC | Age: 28
End: 2023-11-17
Payer: COMMERCIAL

## 2023-11-17 VITALS
OXYGEN SATURATION: 96 % | SYSTOLIC BLOOD PRESSURE: 133 MMHG | HEART RATE: 93 BPM | BODY MASS INDEX: 29.99 KG/M2 | DIASTOLIC BLOOD PRESSURE: 74 MMHG | WEIGHT: 214.2 LBS | TEMPERATURE: 98.4 F | HEIGHT: 71 IN

## 2023-11-17 DIAGNOSIS — F90.0 ATTENTION DEFICIT HYPERACTIVITY DISORDER, PREDOMINANTLY INATTENTIVE TYPE: Primary | ICD-10-CM

## 2023-11-17 PROCEDURE — 1160F RVW MEDS BY RX/DR IN RCRD: CPT | Performed by: FAMILY MEDICINE

## 2023-11-17 PROCEDURE — 1159F MED LIST DOCD IN RCRD: CPT | Performed by: FAMILY MEDICINE

## 2023-11-17 PROCEDURE — 99214 OFFICE O/P EST MOD 30 MIN: CPT | Performed by: FAMILY MEDICINE

## 2023-11-17 RX ORDER — DEXTROAMPHETAMINE SACCHARATE, AMPHETAMINE ASPARTATE, DEXTROAMPHETAMINE SULFATE AND AMPHETAMINE SULFATE 1.25; 1.25; 1.25; 1.25 MG/1; MG/1; MG/1; MG/1
5 TABLET ORAL 2 TIMES DAILY
Qty: 60 TABLET | Refills: 0 | Status: SHIPPED | OUTPATIENT
Start: 2023-11-17

## 2023-11-17 NOTE — PROGRESS NOTES
Subjective     Chief Complaint  ADHD (Per patient he is here to follow up.)    Subjective          Yakelin Enrique is a 28 y.o. male who presents today to [facility] for initial evaluation .    HPI:       Ms. Enrique is a very pleasant 28 year old male who presents today to follow up on ADHD.     He has a history of hypertension, that has been ongoing for the last one year. He has been trying to live a healthier lifestyle.  He reports that he has been running and exercising more.  He has had another 22 pound weight loss.  Blood pressure in office today is well controlled at 133/74.    At last office visit we started him on 5 mg daily.  He has no side effects with the medication. He is sleeping well. He doesn't feel that the medication is as beneficial as when he first started the medication. He is able to focus better in the classes but it wears off in the afternoons.       The following portions of the patient's history were reviewed and updated as appropriate: allergies, current medications, past family history, past medical history, past social history, past surgical history and problem list.    Objective     Objective     Allergy:   Allergies   Allergen Reactions    Sulfa Antibiotics         Current Medications:   Current Outpatient Medications   Medication Sig Dispense Refill    amphetamine-dextroamphetamine (Adderall) 5 MG tablet Take 1 tablet by mouth 2 (Two) Times a Day. 60 tablet 0     No current facility-administered medications for this visit.       Past Medical History:  Past Medical History:   Diagnosis Date    ADHD     OCD (obsessive compulsive disorder)        Past Surgical History:  Past Surgical History:   Procedure Laterality Date    CLOSED REDUCTION ELBOW DISLOCATION Bilateral     DENTAL PROCEDURE         Social History:  Social History     Socioeconomic History    Marital status: Single   Tobacco Use    Smoking  "status: Never     Passive exposure: Never    Smokeless tobacco: Never   Vaping Use    Vaping Use: Never used   Substance and Sexual Activity    Alcohol use: Yes     Comment: socially    Drug use: Defer    Sexual activity: Defer       Family History:  Family History   Problem Relation Age of Onset    Diabetes Mother     Diabetes Brother     Diabetes Maternal Aunt     Diabetes Cousin        Vital Signs:   /74   Pulse 93   Temp 98.4 °F (36.9 °C) (Infrared)   Ht 180.3 cm (70.98\")   Wt 97.2 kg (214 lb 3.2 oz)   SpO2 96%   BMI 29.89 kg/m²      Physical Exam:  Physical Exam  Constitutional:       Appearance: He is normal weight.   Cardiovascular:      Rate and Rhythm: Normal rate and regular rhythm.      Pulses: Normal pulses.      Heart sounds: No murmur heard.  Pulmonary:      Effort: Pulmonary effort is normal.      Breath sounds: Normal breath sounds.   Musculoskeletal:         General: No swelling or tenderness.      Cervical back: Normal range of motion.   Neurological:      Mental Status: He is alert.   Psychiatric:         Mood and Affect: Mood normal.         Behavior: Behavior normal.         Thought Content: Thought content normal.         Judgment: Judgment normal.               PHQ-9 Score  PHQ-9 Total Score:       Lab Review  No visits with results within 3 Month(s) from this visit.   Latest known visit with results is:   Office Visit on 08/17/2023   Component Date Value Ref Range Status    Report Summary 08/17/2023 FINAL   Final    Comment: ====================================================================  [facility] DRUG ANALYSIS,[facility]  ====================================================================  Test                             Result       Flag       Units  Drug Present    Ibuprofen                      PRESENT  ====================================================================  Test                      Result    Flag   Units      Ref Range    Creatinine              165        "       mg/dL      >=20  ====================================================================  Declared Medications:   Medication list was not provided.  ====================================================================  For clinical consultation, please call [phone].  ====================================================================          Radiology Results        Assessment / Plan         Assessment and Plan   Diagnoses and all orders for this visit:    1. Attention deficit hyperactivity disorder, predominantly inattentive type (Primary)  Assessment & Plan:  Psychological condition is improving with treatment.  Medication changes per orders.  Psychological condition  will be reassessed at the next regular appointment.    Increase Adderall to 5 mg twice daily.    The patient has read and signed the [facility] Controlled Substance Contract.  I will continue to see patient for regular follow up appointments.  They are well controlled on their medication.  ROSEMARY is updated every 3 months. The patient is aware of the potential for addiction and dependence.      Orders:  -     amphetamine-dextroamphetamine (Adderall) 5 MG tablet; Take 1 tablet by mouth 2 (Two) Times a Day.  Dispense: 60 tablet; Refill: 0        Discussed possible differential diagnoses, testing, treatment, recommended non-pharmacological interventions, risks, warning signs to monitor for that would indicate need for follow-up in clinic or ER. If no improvement with these regimens or you have new or worsening symptoms follow-up. Patient verbalizes understanding and agreement with plan of care. Denies further needs or concerns.     Patient was given instructions and counseling regarding her condition and for health maintenance advised.    BMI is >= 30 and <35. (Class 1 Obesity). The following options were offered after discussion;: weight loss educational material (shared in after visit summary)         Health Maintenance  Health  Maintenance:   Health Maintenance Due   Topic Date Due    HEPATITIS C SCREENING  Never done    COVID-19 Vaccine (5 - 2023-24 season) 09/01/2023        Meds ordered during this visit  New Medications Ordered This Visit   Medications    amphetamine-dextroamphetamine (Adderall) 5 MG tablet     Sig: Take 1 tablet by mouth 2 (Two) Times a Day.     Dispense:  60 tablet     Refill:  0       Meds stopped during this visit:  Medications Discontinued During This Encounter   Medication Reason    amphetamine-dextroamphetamine (Adderall) 5 MG tablet Reorder          Visit Diagnoses    ICD-10-CM ICD-9-CM   1. Attention deficit hyperactivity disorder, predominantly inattentive type  F90.0 314.00         Patient was given instructions and counseling regarding his condition or for health maintenance advice. Please see specific information pulled into the AVS if appropriate.     Follow Up   Return in about 3 months (around 2/17/2024) for Recheck.      This document has been electronically signed by Marija Corral DO   November 17, 2023 11:03 EST    Dictated Utilizing Dragon Dictation: Part of this note may be an electronic transcription/translation of spoken language to printed text using the Dragon Dictation System.    Marija Corral D.O.  [facility] [place]

## 2023-11-17 NOTE — ASSESSMENT & PLAN NOTE
Psychological condition is improving with treatment.  Medication changes per orders.  Psychological condition  will be reassessed at the next regular appointment.    Increase Adderall to 5 mg twice daily.    The patient has read and signed the [facility] Controlled Substance Contract.  I will continue to see patient for regular follow up appointments.  They are well controlled on their medication.  ROSEMARY is updated every 3 months. The patient is aware of the potential for addiction and dependence.

## 2024-01-03 DIAGNOSIS — F90.0 ATTENTION DEFICIT HYPERACTIVITY DISORDER, PREDOMINANTLY INATTENTIVE TYPE: ICD-10-CM

## 2024-01-03 NOTE — TELEPHONE ENCOUNTER
Caller: Yakelin Enrique    Relationship: Self    Best call back number: [phone]     Requested Prescriptions:   Requested Prescriptions     Pending Prescriptions Disp Refills    amphetamine-dextroamphetamine (Adderall) 5 MG tablet 60 tablet 0     Sig: Take 1 tablet by mouth 2 (Two) Times a Day.      Pharmacy where request should be sent: [facility]/PHARMACY #[id number] - [place], [place] - [address] [facility] [phone] [facility] [phone]      Last office visit with prescribing clinician: 11/17/2023   Last telemedicine visit with prescribing clinician: Visit date not found   Next office visit with prescribing clinician: 2/23/2024     Additional details provided by patient: NO MEDICATION ON HAND FOR A FEW DAYS NOW    Does the patient have less than a 3 day supply:  [x] Yes  [] No    Would you like a call back once the refill request has been completed: [x] Yes [] No    If the office needs to give you a call back, can they leave a voicemail: [] Yes [] No    Haritha Higginbotham Rep   01/03/24 15:33 EST

## 2024-01-04 RX ORDER — DEXTROAMPHETAMINE SACCHARATE, AMPHETAMINE ASPARTATE, DEXTROAMPHETAMINE SULFATE AND AMPHETAMINE SULFATE 1.25; 1.25; 1.25; 1.25 MG/1; MG/1; MG/1; MG/1
5 TABLET ORAL 2 TIMES DAILY
Qty: 60 TABLET | Refills: 0 | Status: SHIPPED | OUTPATIENT
Start: 2024-01-04

## 2024-02-08 DIAGNOSIS — F90.0 ATTENTION DEFICIT HYPERACTIVITY DISORDER, PREDOMINANTLY INATTENTIVE TYPE: ICD-10-CM

## 2024-02-08 NOTE — TELEPHONE ENCOUNTER
Rx Refill Note  Requested Prescriptions     Pending Prescriptions Disp Refills    amphetamine-dextroamphetamine (Adderall) 5 MG tablet 60 tablet 0     Sig: Take 1 tablet by mouth 2 (Two) Times a Day.      Last office visit with prescribing clinician: 11/17/2023   Last telemedicine visit with prescribing clinician: Visit date not found   Next office visit with prescribing clinician: 2/23/2024                         Would you like a call back once the refill request has been completed: [] Yes [] No    If the office needs to give you a call back, can they leave a voicemail: [] Yes [] No    Jeanna Barajas LPN  02/08/24, 15:24 EST

## 2024-02-08 NOTE — TELEPHONE ENCOUNTER
Caller: Yakelin Enrique    Relationship: Self    Best call back number:       [phone] (Mobile)     Requested Prescriptions:   Requested Prescriptions     Pending Prescriptions Disp Refills    amphetamine-dextroamphetamine (Adderall) 5 MG tablet 60 tablet 0     Sig: Take 1 tablet by mouth 2 (Two) Times a Day.      Pharmacy where request should be sent:     [facility]/PHARMACY #[id number] - [place], [place] - [address] [phone] [facility] [phone]      Last office visit with prescribing clinician: 11/17/2023   Last telemedicine visit with prescribing clinician: Visit date not found   Next office visit with prescribing clinician: 2/23/2024     Additional details provided by patient:     PATIENT STATED HE WILL BE OUT OF THE MEDICATION AS OF TOMORROW, 2/9    Does the patient have less than a 3 day supply:  [x] Yes  [] No    Would you like a call back once the refill request has been completed: [] Yes [] No    If the office needs to give you a call back, can they leave a voicemail: [] Yes [] No    Haritha Galvan Rep   02/08/24 14:52 EST

## 2024-02-09 RX ORDER — DEXTROAMPHETAMINE SACCHARATE, AMPHETAMINE ASPARTATE, DEXTROAMPHETAMINE SULFATE AND AMPHETAMINE SULFATE 1.25; 1.25; 1.25; 1.25 MG/1; MG/1; MG/1; MG/1
5 TABLET ORAL 2 TIMES DAILY
Qty: 60 TABLET | Refills: 0 | Status: SHIPPED | OUTPATIENT
Start: 2024-02-09

## 2024-02-23 ENCOUNTER — OFFICE VISIT (OUTPATIENT)
Dept: FAMILY MEDICINE CLINIC | Facility: CLINIC | Age: 29
End: 2024-02-23
Payer: COMMERCIAL

## 2024-02-23 VITALS
WEIGHT: 210.6 LBS | BODY MASS INDEX: 29.48 KG/M2 | OXYGEN SATURATION: 98 % | HEIGHT: 71 IN | SYSTOLIC BLOOD PRESSURE: 124 MMHG | DIASTOLIC BLOOD PRESSURE: 96 MMHG | TEMPERATURE: 98 F | HEART RATE: 94 BPM

## 2024-02-23 DIAGNOSIS — Z77.120 MOLD EXPOSURE: ICD-10-CM

## 2024-02-23 DIAGNOSIS — F90.0 ATTENTION DEFICIT HYPERACTIVITY DISORDER, PREDOMINANTLY INATTENTIVE TYPE: Primary | ICD-10-CM

## 2024-02-23 DIAGNOSIS — L64.9 ALOPECIA, MALE PATTERN: ICD-10-CM

## 2024-02-23 PROCEDURE — 1160F RVW MEDS BY RX/DR IN RCRD: CPT | Performed by: FAMILY MEDICINE

## 2024-02-23 PROCEDURE — 99214 OFFICE O/P EST MOD 30 MIN: CPT | Performed by: FAMILY MEDICINE

## 2024-02-23 PROCEDURE — 1159F MED LIST DOCD IN RCRD: CPT | Performed by: FAMILY MEDICINE

## 2024-02-23 RX ORDER — FINASTERIDE 1 MG/1
1 TABLET, FILM COATED ORAL DAILY
Qty: 30 TABLET | Refills: 2 | Status: SHIPPED | OUTPATIENT
Start: 2024-02-23

## 2024-02-23 RX ORDER — DEXTROAMPHETAMINE SACCHARATE, AMPHETAMINE ASPARTATE, DEXTROAMPHETAMINE SULFATE AND AMPHETAMINE SULFATE 1.25; 1.25; 1.25; 1.25 MG/1; MG/1; MG/1; MG/1
5 TABLET ORAL 2 TIMES DAILY
Qty: 60 TABLET | Refills: 0 | Status: SHIPPED | OUTPATIENT
Start: 2024-02-23

## 2024-02-23 NOTE — ASSESSMENT & PLAN NOTE
He reports multiple family numbers on his mom side with hair loss.  Will do a trial of finasteride

## 2024-02-23 NOTE — ASSESSMENT & PLAN NOTE
Psychological condition is improving with treatment.  Medication changes per orders.  Psychological condition  will be reassessed at the next regular appointment.    Increase Adderall to 5 mg twice daily.    The patient has read and signed the [facility] Controlled Substance Contract.  I will continue to see patient for regular follow up appointments.  They are well controlled on their medication.  ROSEMARY is updated every 3 months. The patient is aware of the potential for addiction and dependence.

## 2024-02-23 NOTE — PROGRESS NOTES
Subjective     Chief Complaint  ADHD (Attention deficit hyperactivity disorder, predominantly inattentive type f/u) and exposed to blacl mold     Subjective          Yakelin Enrique is a 28 y.o. male who presents today to [facility] for initial evaluation .    HPI:       Ms. Enrique is a very pleasant 28 year old male who presents today to follow up on ADHD.     He has a history of hypertension, that has been ongoing for the last one year. He has been trying to live a healthier lifestyle.  He reports that he has been running and exercising more.  He has had another 4 pound weight loss.  Blood pressure in office today is well controlled at 124/96.    For his ADHD he is prescribed Adderall 5 mg BID and feels that this has helped his symptoms.  He has no side effects with the medication. He is sleeping well. He doesn't feel that the medication is as beneficial as when he first started the medication. He is able to focus better in the classes but it wears off in the afternoons.     Acutely, he had an exposure to black mold. He had noted some mold on his window seal of his apartment complex. He noted some mold on his bathtub as well. He is asymptomatic. He has no upper respiratory symptoms, night sweats, shortness of breath etc.      The following portions of the patient's history were reviewed and updated as appropriate: allergies, current medications, past family history, past medical history, past social history, past surgical history and problem list.    Objective     Objective     Allergy:   Allergies   Allergen Reactions    Sulfa Antibiotics         Current Medications:   Current Outpatient Medications   Medication Sig Dispense Refill    amphetamine-dextroamphetamine (Adderall) 5 MG tablet Take 1 tablet by mouth 2 (Two) Times a Day. 60 tablet 0    finasteride (PROPECIA) 1 MG tablet Take 1 tablet by mouth Daily. 30 tablet 2     No  "current facility-administered medications for this visit.       Past Medical History:  Past Medical History:   Diagnosis Date    ADHD     OCD (obsessive compulsive disorder)        Past Surgical History:  Past Surgical History:   Procedure Laterality Date    CLOSED REDUCTION ELBOW DISLOCATION Bilateral     DENTAL PROCEDURE         Social History:  Social History     Socioeconomic History    Marital status: Single   Tobacco Use    Smoking status: Never     Passive exposure: Never    Smokeless tobacco: Never    Tobacco comments:     None   Vaping Use    Vaping Use: Never used   Substance and Sexual Activity    Alcohol use: Yes     Comment: socially    Drug use: Never    Sexual activity: Defer       Family History:  Family History   Problem Relation Age of Onset    Diabetes Mother         Brother and mother have T1D    Diabetes Brother     Diabetes Maternal Aunt     Diabetes Cousin        Vital Signs:   /96   Pulse 94   Temp 98 °F (36.7 °C) (Infrared)   Ht 180.3 cm (70.98\")   Wt 95.5 kg (210 lb 9.6 oz)   SpO2 98%   BMI 29.39 kg/m²      Physical Exam:  Physical Exam  Constitutional:       Appearance: He is normal weight.   Cardiovascular:      Rate and Rhythm: Normal rate and regular rhythm.      Pulses: Normal pulses.      Heart sounds: No murmur heard.  Pulmonary:      Effort: Pulmonary effort is normal.      Breath sounds: Normal breath sounds.   Musculoskeletal:         General: No swelling or tenderness.      Cervical back: Normal range of motion.   Neurological:      Mental Status: He is alert.   Psychiatric:         Mood and Affect: Mood normal.         Behavior: Behavior normal.         Thought Content: Thought content normal.         Judgment: Judgment normal.               PHQ-9 Score  PHQ-9 Total Score: 0     Lab Review  No visits with results within 3 Month(s) from this visit.   Latest known visit with results is:   Office Visit on 08/17/2023   Component Date Value Ref Range Status    Report " Summary 08/17/2023 FINAL   Final    Comment: ====================================================================  [facility] COMP DRUG ANALYSIS,[facility]  ====================================================================  Test                             Result       Flag       Units  Drug Present    Ibuprofen                      PRESENT  ====================================================================  Test                      Result    Flag   Units      Ref Range    Creatinine              165              mg/dL      >=20  ====================================================================  Declared Medications:   Medication list was not provided.  ====================================================================  For clinical consultation, please call [phone].  ====================================================================          Radiology Results        Assessment / Plan         Assessment and Plan   Diagnoses and all orders for this visit:    1. Attention deficit hyperactivity disorder, predominantly inattentive type (Primary)  Assessment & Plan:  Psychological condition is improving with treatment.  Medication changes per orders.  Psychological condition  will be reassessed at the next regular appointment.    Increase Adderall to 5 mg twice daily.    The patient has read and signed the [facility] Controlled Substance Contract.  I will continue to see patient for regular follow up appointments.  They are well controlled on their medication.  ROSEMARY is updated every 3 months. The patient is aware of the potential for addiction and dependence.      Orders:  -     amphetamine-dextroamphetamine (Adderall) 5 MG tablet; Take 1 tablet by mouth 2 (Two) Times a Day.  Dispense: 60 tablet; Refill: 0    2. 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  Will do a trial of finasteride    Orders:  -     finasteride (PROPECIA) 1 MG  tablet; Take 1 tablet by mouth Daily.  Dispense: 30 tablet; Refill: 2    3. Mold exposure  Assessment & Plan:  Reassurance given.  Most likely mold that he was exposed to was not hazardous.  Resources from the CDC and EPA were given to patient.            Discussed possible differential diagnoses, testing, treatment, recommended non-pharmacological interventions, risks, warning signs to monitor for that would indicate need for follow-up in clinic or ER. If no improvement with these regimens or you have new or worsening symptoms follow-up. Patient verbalizes understanding and agreement with plan of care. Denies further needs or concerns.     Patient was given instructions and counseling regarding her condition and for health maintenance advised.    BMI is >= 30 and <35. (Class 1 Obesity). The following options were offered after discussion;: weight loss educational material (shared in after visit summary)         Health Maintenance  Health Maintenance:   Health Maintenance Due   Topic Date Due    HEPATITIS C SCREENING  Never done        Meds ordered during this visit  New Medications Ordered This Visit   Medications    amphetamine-dextroamphetamine (Adderall) 5 MG tablet     Sig: Take 1 tablet by mouth 2 (Two) Times a Day.     Dispense:  60 tablet     Refill:  0    finasteride (PROPECIA) 1 MG tablet     Sig: Take 1 tablet by mouth Daily.     Dispense:  30 tablet     Refill:  2       Meds stopped during this visit:  Medications Discontinued During This Encounter   Medication Reason    amphetamine-dextroamphetamine (Adderall) 5 MG tablet Reorder            Visit Diagnoses    ICD-10-CM ICD-9-CM   1. Attention deficit hyperactivity disorder, predominantly inattentive type  F90.0 314.00   2. Alopecia, male pattern  L64.9 704.09   3. Mold exposure  Z77.120 V87.31           Patient was given instructions and counseling regarding his condition or for health maintenance advice. Please see specific information pulled into the  AVS if appropriate.     Follow Up   Return in about 3 months (around 5/23/2024) for Recheck ADHD .      This document has been electronically signed by Marija Corral DO   February 23, 2024 11:25 EST    Dictated Utilizing Dragon Dictation: Part of this note may be an electronic transcription/translation of spoken language to printed text using the Dragon Dictation System.    Marija Corral D.O.  [facility]

## 2024-02-23 NOTE — ASSESSMENT & PLAN NOTE
Reassurance given.  Most likely mold that he was exposed to was not hazardous.  Resources from the CDC and EPA were given to patient.

## 2024-04-05 DIAGNOSIS — F90.0 ATTENTION DEFICIT HYPERACTIVITY DISORDER, PREDOMINANTLY INATTENTIVE TYPE: ICD-10-CM

## 2024-04-05 NOTE — TELEPHONE ENCOUNTER
Caller: Yakelin Enrique    Relationship: Self    Best call back number: [phone]     Requested Prescriptions:   Requested Prescriptions     Pending Prescriptions Disp Refills    amphetamine-dextroamphetamine (Adderall) 5 MG tablet 60 tablet 0     Sig: Take 1 tablet by mouth 2 (Two) Times a Day.        Pharmacy where request should be sent: [facility]/PHARMACY #[id number] - [facility] [address] [phone] [facility] [phone]      Last office visit with prescribing clinician: 2/23/2024   Last telemedicine visit with prescribing clinician: Visit date not found   Next office visit with prescribing clinician: 5/17/2024     Additional details provided by patient: COMPLETELY OUT    Does the patient have less than a 3 day supply:  [x] Yes  [] No    Would you like a call back once the refill request has been completed: [] Yes [x] No    If the office needs to give you a call back, can they leave a voicemail: [] Yes [x] No    Haritha Stanley Rep   04/05/24 14:34 EDT

## 2024-04-06 NOTE — TELEPHONE ENCOUNTER
Rx Refill Note  Requested Prescriptions     Pending Prescriptions Disp Refills    amphetamine-dextroamphetamine (Adderall) 5 MG tablet 60 tablet 0     Sig: Take 1 tablet by mouth 2 (Two) Times a Day.      Last office visit with prescribing clinician: 2/23/2024   Last telemedicine visit with prescribing clinician: Visit date not found   Next office visit with prescribing clinician: 5/17/2024                         Would you like a call back once the refill request has been completed: [] Yes [] No    If the office needs to give you a call back, can they leave a voicemail: [] Yes [] No    Cristina Barajas MA  04/06/24, 13:05 EDT

## 2024-04-08 RX ORDER — DEXTROAMPHETAMINE SACCHARATE, AMPHETAMINE ASPARTATE, DEXTROAMPHETAMINE SULFATE AND AMPHETAMINE SULFATE 1.25; 1.25; 1.25; 1.25 MG/1; MG/1; MG/1; MG/1
5 TABLET ORAL 2 TIMES DAILY
Qty: 60 TABLET | Refills: 0 | Status: SHIPPED | OUTPATIENT
Start: 2024-04-08

## 2024-05-22 DIAGNOSIS — L64.9 ALOPECIA, MALE PATTERN: ICD-10-CM

## 2024-05-28 RX ORDER — FINASTERIDE 1 MG/1
1 TABLET, FILM COATED ORAL DAILY
Qty: 30 TABLET | Refills: 2 | Status: SHIPPED | OUTPATIENT
Start: 2024-05-28

## 2024-05-28 NOTE — TELEPHONE ENCOUNTER
Caller: KlausYakelin vela    Relationship: Self    Best call back number: [phone]     Requested Prescriptions:   Requested Prescriptions     Pending Prescriptions Disp Refills    finasteride (PROPECIA) 1 MG tablet [Pharmacy Med Name: FINASTERIDE 1 MG TABLET] 30 tablet 2     Sig: TAKE 1 TABLET BY MOUTH EVERY DAY        Pharmacy where request should be sent: [facility]/PHARMACY #[id number] - [facility] [address] [phone] [facility] [phone]      Last office visit with prescribing clinician: 2/23/2024   Last telemedicine visit with prescribing clinician: Visit date not found   Next office visit with prescribing clinician: 6/20/2024     Additional details provided by patient: TOOK HIS LAST DOSE TODAY    Does the patient have less than a 3 day supply:  [x] Yes  [] No    Would you like a call back once the refill request has been completed: [] Yes [x] No    If the office needs to give you a call back, can they leave a voicemail: [] Yes [x] No    Nadia Reyes, PCT   05/28/24 13:31 EDT

## 2024-06-20 ENCOUNTER — OFFICE VISIT (OUTPATIENT)
Dept: FAMILY MEDICINE CLINIC | Facility: CLINIC | Age: 29
End: 2024-06-20
Payer: COMMERCIAL

## 2024-06-20 VITALS
SYSTOLIC BLOOD PRESSURE: 126 MMHG | TEMPERATURE: 99 F | HEIGHT: 71 IN | HEART RATE: 100 BPM | WEIGHT: 215 LBS | DIASTOLIC BLOOD PRESSURE: 88 MMHG | OXYGEN SATURATION: 97 % | BODY MASS INDEX: 30.1 KG/M2

## 2024-06-20 DIAGNOSIS — L64.9 ALOPECIA, MALE PATTERN: ICD-10-CM

## 2024-06-20 DIAGNOSIS — Z86.59 HISTORY OF OCD (OBSESSIVE COMPULSIVE DISORDER): ICD-10-CM

## 2024-06-20 DIAGNOSIS — H60.501 ACUTE OTITIS EXTERNA OF RIGHT EAR, UNSPECIFIED TYPE: ICD-10-CM

## 2024-06-20 DIAGNOSIS — F41.1 GENERALIZED ANXIETY DISORDER: Primary | ICD-10-CM

## 2024-06-20 DIAGNOSIS — F90.0 ATTENTION DEFICIT HYPERACTIVITY DISORDER, PREDOMINANTLY INATTENTIVE TYPE: ICD-10-CM

## 2024-06-20 PROCEDURE — 1126F AMNT PAIN NOTED NONE PRSNT: CPT | Performed by: FAMILY MEDICINE

## 2024-06-20 PROCEDURE — 1159F MED LIST DOCD IN RCRD: CPT | Performed by: FAMILY MEDICINE

## 2024-06-20 PROCEDURE — 99214 OFFICE O/P EST MOD 30 MIN: CPT | Performed by: FAMILY MEDICINE

## 2024-06-20 PROCEDURE — 1160F RVW MEDS BY RX/DR IN RCRD: CPT | Performed by: FAMILY MEDICINE

## 2024-06-20 RX ORDER — FINASTERIDE 1 MG/1
1 TABLET, FILM COATED ORAL DAILY
Qty: 90 TABLET | Refills: 3 | Status: SHIPPED | OUTPATIENT
Start: 2024-06-20

## 2024-06-20 RX ORDER — PROPRANOLOL HYDROCHLORIDE 20 MG/1
20 TABLET ORAL ONCE
Qty: 1 TABLET | Refills: 0 | Status: SHIPPED | OUTPATIENT
Start: 2024-06-20 | End: 2024-06-20

## 2024-06-20 RX ORDER — CIPROFLOXACIN AND DEXAMETHASONE 3; 1 MG/ML; MG/ML
4 SUSPENSION/ DROPS AURICULAR (OTIC) 2 TIMES DAILY
Qty: 7.5 ML | Refills: 0 | Status: SHIPPED | OUTPATIENT
Start: 2024-06-20

## 2024-06-20 NOTE — ASSESSMENT & PLAN NOTE
Psychological condition is worsening.  Medication changes per orders.  Psychological condition  will be reassessed in 4 weeks.    - Restarting 50 mg daily of Zoloft.    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

## 2024-06-20 NOTE — PROGRESS NOTES
Subjective     Chief Complaint  ADHD (Per patient he is following up on ADHD and Adderall; states he stopped the medication now that class is over. ) and Earache (Per patient his left ear aches x 1 day)    Subjective          Yakelin Enrique is a 29 y.o. male who presents today to [facility] for initial evaluation .    HPI:       Ms. Enrique is a very pleasant 29 year old male who presents today to follow up on ADHD.     For his ADHD he is prescribed Adderall 5 mg BID and feels that this has helped his symptoms. Now that he has graduated from his masters program he is no longer taking the medication and is coping well with changes in scheduling etc. He also has a history of OCD, especially in anxious situations. He has underlying anxiety, as well. He has a lot of life stressors and new things going on at this time. He has taken Zoloft in the past and saw good relief in symptoms. He is cutting back on caffeine and nicotine.     Acutely, he is having left ear discomfort and pressure. He hasn't had a fever. Mild sinus/allergy symptoms.     The following portions of the patient's history were reviewed and updated as appropriate: allergies, current medications, past family history, past medical history, past social history, past surgical history and problem list.    Objective     Objective     Allergy:   Allergies   Allergen Reactions    Sulfa Antibiotics         Current Medications:   Current Outpatient Medications   Medication Sig Dispense Refill    finasteride (PROPECIA) 1 MG tablet Take 1 tablet by mouth Daily. 90 tablet 3    sertraline (Zoloft) 50 MG tablet Take 1 tablet by mouth Daily. 90 tablet 1    ciprofloxacin-dexAMETHasone (Ciprodex) 0.3-0.1 % otic suspension Administer 4 drops to the right ear 2 (Two) Times a Day. 7.5 mL 0    propranolol (INDERAL) 20 MG tablet Take 1 tablet by mouth 1 (One) Time for 1 dose. An hour before speech  "1 tablet 0     No current facility-administered medications for this visit.       Past Medical History:  Past Medical History:   Diagnosis Date    ADHD     OCD (obsessive compulsive disorder)        Past Surgical History:  Past Surgical History:   Procedure Laterality Date    CLOSED REDUCTION ELBOW DISLOCATION Bilateral     DENTAL PROCEDURE         Social History:  Social History     Socioeconomic History    Marital status: Single   Tobacco Use    Smoking status: Never     Passive exposure: Never    Smokeless tobacco: Never    Tobacco comments:     None   Vaping Use    Vaping status: Never Used   Substance and Sexual Activity    Alcohol use: Yes     Comment: socially    Drug use: Never    Sexual activity: Defer       Family History:  Family History   Problem Relation Age of Onset    Diabetes Mother         Brother and mother have T1D    Diabetes Brother     Diabetes Maternal Aunt     Diabetes Cousin        Vital Signs:   /88   Pulse 100   Temp 99 °F (37.2 °C) (Infrared)   Ht 180.3 cm (70.98\")   Wt 97.5 kg (215 lb)   SpO2 97%   BMI 30.00 kg/m²      Physical Exam:  Physical Exam  Constitutional:       Appearance: He is normal weight.   HENT:      Ears:      Comments: Erythema in right ear canal with slight drainage   Cardiovascular:      Rate and Rhythm: Normal rate and regular rhythm.      Pulses: Normal pulses.      Heart sounds: No murmur heard.  Pulmonary:      Effort: Pulmonary effort is normal.      Breath sounds: Normal breath sounds.   Musculoskeletal:         General: No swelling or tenderness.      Cervical back: Normal range of motion.   Neurological:      Mental Status: He is alert.   Psychiatric:         Mood and Affect: Mood normal.         Behavior: Behavior normal.         Thought Content: Thought content normal.         Judgment: Judgment normal.               PHQ-9 Score  PHQ-9 Total Score:       Lab Review  No visits with results within 3 Month(s) from this visit.   Latest known visit " with results is:   Office Visit on 08/17/2023   Component Date Value Ref Range Status    Report Summary 08/17/2023 FINAL   Final    Comment: ====================================================================  [facility] COMP DRUG ANALYSIS,[facility]  ====================================================================  Test                             Result       Flag       Units  Drug Present    Ibuprofen                      PRESENT  ====================================================================  Test                      Result    Flag   Units      Ref Range    Creatinine              165              mg/dL      >=20  ====================================================================  Declared Medications:   Medication list was not provided.  ====================================================================  For clinical consultation, please call [phone].  ====================================================================          Radiology Results        Assessment / Plan         Assessment and Plan   Diagnoses and all orders for this visit:    1. Generalized anxiety disorder (Primary)  Assessment & Plan:  Psychological condition is worsening.  Medication changes per orders.  Psychological condition  will be reassessed in 4 weeks.    - Restarting 50 mg daily of Zoloft.    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     Orders:  -     Discontinue: sertraline (Zoloft) 50 MG tablet; Take 1 tablet by mouth Daily.  Dispense: 30 tablet; Refill: 2  -     sertraline (Zoloft) 50 MG tablet; Take 1 tablet by mouth Daily.  Dispense: 90 tablet; Refill: 1  -     Ambulatory Referral to Behavioral Health  -     propranolol (INDERAL) 20 MG tablet; Take 1 tablet by mouth 1 (One) Time for 1 dose. An hour before speech  Dispense: 1 tablet; Refill: 0    2. 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  Dispense: 90 tablet; Refill: 3    3. Attention deficit hyperactivity disorder, predominantly inattentive type  -     Ambulatory Referral to Behavioral Health    4. History of OCD (obsessive compulsive disorder)  -     Ambulatory Referral to Behavioral Health    5. Acute otitis externa of right ear, unspecified type  Assessment & Plan:  - rx Ciprodex   - keep water out of ear     Orders:  -     ciprofloxacin-dexAMETHasone (Ciprodex) 0.3-0.1 % otic suspension; Administer 4 drops to the right ear 2 (Two) Times a Day.  Dispense: 7.5 mL; Refill: 0            Discussed possible differential diagnoses, testing, treatment, recommended non-pharmacological interventions, risks, warning signs to monitor for that would indicate need for follow-up in clinic or ER. If no improvement with these regimens or you have new or worsening symptoms follow-up. Patient verbalizes understanding and agreement with plan of care. Denies further needs or concerns.     Patient was given instructions and counseling regarding her condition and for health maintenance advised.    BMI is >= 30 and <35. (Class 1 Obesity). The following options were offered after discussion;: weight loss educational material (shared in after visit summary)         Health Maintenance  Health Maintenance:   Health Maintenance Due   Topic Date Due    HEPATITIS C SCREENING  Never done        Meds ordered during this visit  New Medications Ordered This Visit   Medications    finasteride (PROPECIA) 1 MG tablet     Sig: Take 1 tablet by mouth Daily.     Dispense:  90 tablet     Refill:  3    sertraline (Zoloft) 50 MG tablet     Sig: Take 1 tablet by mouth Daily.     Dispense:  90 tablet     Refill:  1    propranolol (INDERAL) 20 MG tablet     Sig: Take 1 tablet by mouth 1 (One) Time for 1 dose. An hour before speech     Dispense:  1 tablet     Refill:  0    ciprofloxacin-dexAMETHasone (Ciprodex) 0.3-0.1 % otic suspension      Sig: Administer 4 drops to the right ear 2 (Two) Times a Day.     Dispense:  7.5 mL     Refill:  0       Meds stopped during this visit:  Medications Discontinued During This Encounter   Medication Reason    amphetamine-dextroamphetamine (Adderall) 5 MG tablet Patient Reported Not Taking    finasteride (PROPECIA) 1 MG tablet Reorder    sertraline (Zoloft) 50 MG tablet Reorder              Visit Diagnoses    ICD-10-CM ICD-9-CM   1. Generalized anxiety disorder  F41.1 300.02   2. Alopecia, male pattern  L64.9 704.09   3. Attention deficit hyperactivity disorder, predominantly inattentive type  F90.0 314.00   4. History of OCD (obsessive compulsive disorder)  Z86.59 V11.2   5. Acute otitis externa of right ear, unspecified type  H60.501 380.10             Patient was given instructions and counseling regarding his condition or for health maintenance advice. Please see specific information pulled into the AVS if appropriate.     Follow Up   Return in about 4 weeks (around 7/18/2024) for followup Anxiety .      This document has been electronically signed by Marija Corral DO   June 20, 2024 16:11 EDT    Dictated Utilizing Dragon Dictation: Part of this note may be an electronic transcription/translation of spoken language to printed text using the Dragon Dictation System.    Marija Corral D.O.  [facility] [place]

## 2024-06-21 ENCOUNTER — TELEPHONE (OUTPATIENT)
Dept: FAMILY MEDICINE CLINIC | Facility: CLINIC | Age: 29
End: 2024-06-21
Payer: COMMERCIAL

## 2024-06-21 NOTE — TELEPHONE ENCOUNTER
Caller: Yakelin Enrique    Relationship: Self    Best call back number: [phone]     What is the best time to reach you: ANYTIME    Who are you requesting to speak with (clinical staff, provider,  specific staff member): PROVIDER    Do you know the name of the person who called: NA    What was the call regarding: PATIENT CALLING AND WOULD LIKE TO SEE ABOUT AN ALTERNATIVE MEDICATION DUE TO THE EAR DROP NOT GOING DOWN IN THE EAR.     Is it okay if the provider responds through MyChart: NO

## 2024-06-21 NOTE — TELEPHONE ENCOUNTER
Called and spoke with patient. Advised him if ear is that swollen we need to see him. He declined appointment. Discussed proper way of putting ear drops in, even told him to have someone place the drops and use cotton ball. Patient will call Monday if no improvement.

## 2024-07-25 ENCOUNTER — OFFICE VISIT (OUTPATIENT)
Dept: FAMILY MEDICINE CLINIC | Facility: CLINIC | Age: 29
End: 2024-07-25
Payer: COMMERCIAL

## 2024-07-25 VITALS
WEIGHT: 216 LBS | HEIGHT: 71 IN | BODY MASS INDEX: 30.24 KG/M2 | TEMPERATURE: 98.6 F | SYSTOLIC BLOOD PRESSURE: 134 MMHG | DIASTOLIC BLOOD PRESSURE: 90 MMHG | HEART RATE: 93 BPM

## 2024-07-25 DIAGNOSIS — K08.89 PAIN, DENTAL: ICD-10-CM

## 2024-07-25 DIAGNOSIS — F41.1 GENERALIZED ANXIETY DISORDER: Primary | ICD-10-CM

## 2024-07-25 PROCEDURE — 99214 OFFICE O/P EST MOD 30 MIN: CPT | Performed by: FAMILY MEDICINE

## 2024-07-25 PROCEDURE — 1125F AMNT PAIN NOTED PAIN PRSNT: CPT | Performed by: FAMILY MEDICINE

## 2024-07-25 PROCEDURE — 1159F MED LIST DOCD IN RCRD: CPT | Performed by: FAMILY MEDICINE

## 2024-07-25 PROCEDURE — 1160F RVW MEDS BY RX/DR IN RCRD: CPT | Performed by: FAMILY MEDICINE

## 2024-07-25 RX ORDER — SODIUM FLUORIDE1.1%, POTASSIUM NITRATE 5% 5.8; 57.5 MG/ML; MG/ML
GEL, DENTIFRICE DENTAL
COMMUNITY
Start: 2024-07-24

## 2024-07-25 RX ORDER — SERTRALINE HYDROCHLORIDE 100 MG/1
100 TABLET, FILM COATED ORAL DAILY
Qty: 90 TABLET | Refills: 1 | Status: SHIPPED | OUTPATIENT
Start: 2024-07-25

## 2024-07-25 NOTE — ASSESSMENT & PLAN NOTE
Psychological condition is improving with treatment.  Medication changes per orders.  Referral to psychological counseling.  Psychological condition  will be reassessed at the next regular appointment.  Increase Zoloft to 100 mg daily

## 2024-07-25 NOTE — PROGRESS NOTES
Subjective     Chief Complaint  Depression and Dental Pain (Would like a referral)    Subjective          Yakelin Enrique is a 29 y.o. male who presents today to [facility] for initial evaluation .    HPI:       Ms. Enrique is a very pleasant 29 year old male who presents today to follow up on anxiety.   He had been previously been prescribed Adderall for ADHD, however, he has not had symptoms with issues with ADHD lately. He has been having some increased anxiety symptoms. He was restarted on Zoloft 50 mg daily at this last visit.  He reports tolerating the medication well but hasn't seen a lot of benefit just yet. When he previously took this medication, he was on a much higher dose.     Acutely, he reports mouth pain, he had a cleaning yesterday and no evidence of significant cavity etc. The pain is located in the left canine tooth and the one beside of it.     The following portions of the patient's history were reviewed and updated as appropriate: allergies, current medications, past family history, past medical history, past social history, past surgical history and problem list.    Objective     Objective     Allergy:   Allergies   Allergen Reactions    Sulfa Antibiotics         Current Medications:   Current Outpatient Medications   Medication Sig Dispense Refill    finasteride (PROPECIA) 1 MG tablet Take 1 tablet by mouth Daily. 90 tablet 3    sertraline (Zoloft) 100 MG tablet Take 1 tablet by mouth Daily. 90 tablet 1    Sodium Fluoride 5000 Sensitive 1.1-5 % gel       propranolol (INDERAL) 20 MG tablet Take 1 tablet by mouth 1 (One) Time for 1 dose. An hour before speech 1 tablet 0     No current facility-administered medications for this visit.       Past Medical History:  Past Medical History:   Diagnosis Date    ADHD     OCD (obsessive compulsive disorder)        Past Surgical History:  Past Surgical History:   Procedure  "Laterality Date    CLOSED REDUCTION ELBOW DISLOCATION Bilateral     DENTAL PROCEDURE         Social History:  Social History     Socioeconomic History    Marital status: Single   Tobacco Use    Smoking status: Never     Passive exposure: Never    Smokeless tobacco: Never    Tobacco comments:     None   Vaping Use    Vaping status: Never Used   Substance and Sexual Activity    Alcohol use: Yes     Comment: socially    Drug use: Never    Sexual activity: Defer       Family History:  Family History   Problem Relation Age of Onset    Diabetes Mother         Brother and mother have T1D    Diabetes Brother     Diabetes Maternal Aunt     Diabetes Cousin        Vital Signs:   /90   Pulse 93   Temp 98.6 °F (37 °C) (Infrared)   Ht 180.3 cm (70.98\")   Wt 98 kg (216 lb)   BMI 30.14 kg/m²      Physical Exam:  Physical Exam  Vitals reviewed.   Constitutional:       Appearance: He is normal weight.   Cardiovascular:      Rate and Rhythm: Normal rate and regular rhythm.      Pulses: Normal pulses.      Heart sounds: No murmur heard.  Pulmonary:      Effort: Pulmonary effort is normal.      Breath sounds: Normal breath sounds.   Musculoskeletal:         General: No swelling or tenderness.      Cervical back: Normal range of motion.   Neurological:      Mental Status: He is alert.   Psychiatric:         Mood and Affect: Mood normal.         Behavior: Behavior normal.         Thought Content: Thought content normal.         Judgment: Judgment normal.               PHQ-9 Score  PHQ-9 Total Score:       Lab Review  No visits with results within 3 Month(s) from this visit.   Latest known visit with results is:   Office Visit on 08/17/2023   Component Date Value Ref Range Status    Report Summary 08/17/2023 FINAL   Final    Comment: ====================================================================  [facility]  ====================================================================  Test                      "        Result       Flag       Units  Drug Present    Ibuprofen                      PRESENT  ====================================================================  Test                      Result    Flag   Units      Ref Range    Creatinine              165              mg/dL      >=20  ====================================================================  Declared Medications:   Medication list was not provided.  ====================================================================  For clinical consultation, please call [phone].  ====================================================================          Radiology Results        Assessment / Plan         Assessment and Plan   Diagnoses and all orders for this visit:    1. Generalized anxiety disorder (Primary)  Assessment & Plan:  Psychological condition is improving with treatment.  Medication changes per orders.  Referral to psychological counseling.  Psychological condition  will be reassessed at the next regular appointment.  Increase Zoloft to 100 mg daily     Orders:  -     sertraline (Zoloft) 100 MG tablet; Take 1 tablet by mouth Daily.  Dispense: 90 tablet; Refill: 1    2. Pain, dental  -     Ambulatory Referral to Dentistry              Discussed possible differential diagnoses, testing, treatment, recommended non-pharmacological interventions, risks, warning signs to monitor for that would indicate need for follow-up in clinic or ER. If no improvement with these regimens or you have new or worsening symptoms follow-up. Patient verbalizes understanding and agreement with plan of care. Denies further needs or concerns.     Patient was given instructions and counseling regarding her condition and for health maintenance advised.    BMI is >= 30 and <35. (Class 1 Obesity). The following options were offered after discussion;: weight loss educational material (shared in after visit summary)         Health Maintenance  Health Maintenance:   Health  Maintenance Due   Topic Date Due    HEPATITIS C SCREENING  Never done        Meds ordered during this visit  New Medications Ordered This Visit   Medications    sertraline (Zoloft) 100 MG tablet     Sig: Take 1 tablet by mouth Daily.     Dispense:  90 tablet     Refill:  1       Meds stopped during this visit:  Medications Discontinued During This Encounter   Medication Reason    ciprofloxacin-dexAMETHasone (Ciprodex) 0.3-0.1 % otic suspension     sertraline (Zoloft) 50 MG tablet Reorder                Visit Diagnoses    ICD-10-CM ICD-9-CM   1. Generalized anxiety disorder  F41.1 300.02   2. Pain, dental  K08.89 525.9             Patient was given instructions and counseling regarding his condition or for health maintenance advice. Please see specific information pulled into the AVS if appropriate.     Follow Up   Return in about 3 months (around 10/25/2024) for followup Anxiety .      This document has been electronically signed by Marija Corral DO   July 25, 2024 14:18 EDT    Dictated Utilizing Dragon Dictation: Part of this note may be an electronic transcription/translation of spoken language to printed text using the Dragon Dictation System.    Marija Corral D.O.  [facility] [place]

## 2024-08-15 ENCOUNTER — TELEMEDICINE (OUTPATIENT)
Dept: PSYCHIATRY | Facility: CLINIC | Age: 29
End: 2024-08-15
Payer: COMMERCIAL

## 2024-08-15 ENCOUNTER — TELEPHONE (OUTPATIENT)
Dept: FAMILY MEDICINE CLINIC | Facility: CLINIC | Age: 29
End: 2024-08-15

## 2024-08-15 DIAGNOSIS — F41.1 GENERALIZED ANXIETY DISORDER: Chronic | ICD-10-CM

## 2024-08-15 DIAGNOSIS — F33.0 MILD EPISODE OF RECURRENT MAJOR DEPRESSIVE DISORDER: Chronic | ICD-10-CM

## 2024-08-15 DIAGNOSIS — F42.2 MIXED OBSESSIONAL THOUGHTS AND ACTS: Primary | Chronic | ICD-10-CM

## 2024-08-15 RX ORDER — ACETYLCYSTEINE 600 MG
600 CAPSULE ORAL 2 TIMES DAILY
COMMUNITY

## 2024-08-15 NOTE — TELEPHONE ENCOUNTER
Caller: Yakelin Enrique    Relationship: Self    Best call back number: [phone]    What is the medical concern/diagnosis: OCD     What specialty or service is being requested: DOCTOR OF PSYCHIATRY SOMEONE THAT SPECIALIZES ON OCD      Any additional details: THE PATIENT WOULD LIKE TO BE REFERRED TO SOMEONE THAT SPECIALIZES IN OCD

## 2024-08-15 NOTE — PROGRESS NOTES
This provider is completing this appointment through [facility] (through [facility]), [address], [place], [address] using a secure [email or web address] Video Visit through [employer]. Patient is being seen remotely via telehealth at their residence in [place], and stated they are in a secure environment for this session. The patient's condition being diagnosed/treated is appropriate for telemedicine. The provider identified herself as well as her credentials.   The patient, and/or patients guardian, consent to be seen remotely, and when consent is given they understand that the consent allows for patient identifiable information to be sent to a third party as needed.   They may refuse to be seen remotely at any time. The electronic data is encrypted and password protected, and the patient and/or guardian has been advised of the potential risks to privacy not withstanding such measures.    You have chosen to receive care through a telehealth visit.  Do you consent to use a video/audio connection for your medical care today? Yes    Patient identifiers utilized: Name and date of birth.          Carlos Enrique is a 29 y.o. male who presents today for initial evaluation     Chief Complaint:  OCD, anxiety, depression    Accompanied by:Pt's mother was present for the first ten minutes of appointment with pt's verbal consent    History of Present Illness:   Pt reports he was referred to psychiatry mainly for OCD. However, he has also been diagnosed with depression, anxiety, and ADHD. Pt reports he began experiencing OCD symptoms while he was in high school and worsened around the time of graduation. He reports there being multiple life changes around that time, such as mother going through a split from her significant other, assisting with her moving out, and his brother going through a difficult time. Pt was also looking for employment. Pt states he struggles driving and the OCD  "makes it more difficult than it needs to be. If he hits a bump on the road he becomes very uneasy. Pt checks doors and the stove repeatedly to make sure they are turned off despite knowing he has already checked. When he is driving, he often has to [place] back to the house to make sure. If he does not, he becomes fixated on it and causes him distress. Pt reports experiencing intrusive thoughts and rumination. Pt states the distress can take hours to days to overcome, especially if he leaves for days on a vacation. Per pt, it used to be debilitating to the point he was unable to leave the house. He has seen marginal difference over the years. He is now able to leave the house and drive, although it makes him uncomfortable. OCD symptoms used to consume his entire day and now it's about 2-3 hours of his day. He tries to not act on the compulsions. Pt has tried TMS and did not find it helpful. He takes NAC and Zoloft and finds minimal relief. The patient endorses significant symptoms of obsessive compulsive disorder including: recurrent and persistent thoughts, impulses or images that are experienced and are intrusive and inappropriate,\"difficulty suppressing such thoughts, repetitive behaviors or mental acts that the patient feels driven to perform in response to an obsession according to rigid rules, and these behaviors are aimed at preventing or reducing distress of some dreaded event which have caused impairment in important areas of daily functioning. The patient rates their obsessive compulsive symptoms at a 11/10 on a 0-10 scale, with 10 being the worst. Pt reports anxiety began in high school and worsened toward the end of college. Pt considers himself a \"worry wort.\" Pt worries over every day, routine life circumstances, even over things he has no control over. The patient endorses significant symptoms of anxiety including: difficulty relaxing, excessive anxiety and worry about a number of events or activities " for more days than not, restlessness or feeling keyed up, being easily fatigued, difficulty concentrating or mind going blank, irritability, sleep disturbance, catastrophic thinking, overanalyzes, and feeling of impending doom which have caused impairment in important areas of daily functioning. The patient rates their anxiety on average in the past week at a 7-8/10 on a 0-10 scale, with 10 being the worst. Pt states depression began when he was 11 or 13 YO following the divorce of his parents. Pt feels most of his depression is situational and is related to a lack of contentment. Pt reports seeking employment and recent family members passing has contributed to recent symptoms. The patient endorses significant symptoms of depression including: changes in sleep, reduced interests in activities, difficulty with concentration, and change in appetite which have caused impairment in important areas of daily functioning. The patient rates their depression on average in the past week at a 4/10 on a 0-10 scale, with 10 being the worst.    Current Psychiatric Medications:  Zoloft 100 mg PO Daily (pt reports he used to be on 200 mg; he doesn't feel it's very effective for OCD)    Prior Psychiatric Medications:  Zoloft - he used to be on 200 mg; he doesn't feel it's very effective for OCD  Propranolol - does not believe it was very effective  Adderall - may have helped some   Clomipramine   Lithium  Latuda  Luvox - sexual side effects, weight gain  Lexapro?   Effexor XR?  Cymbalta   Seroquel    Currently in Counseling or Therapy:  Denies, pt states therapy does not work for him.     Prior Psychiatric Outpatient Care:  Pt has previously been diagnosed with ADHD, depression, MILAD, and OCD. Pt states he was misdiagnosed with bipolar disorder.   Pt has been in therapy in the past and states it does not work for him. Pt has seen multiple psychiatrists in the past. Pt first started treatment in college, around the age of 23.      Prior Psychiatric Hospitalizations:  Denies    Previous Suicide Attempts:  Denies    Previous Self-Harming Behavior:  Denies    Any family history of suicide attempts:  Denies    Legal History, Arrests, or Incarcerations:  Denies    Violent Tendencies:  Denies    History of Seizures or TBI:  Denies    Highest Level of Education:  Master's Degree in Finance and Law    Employment:  Pt is currently looking for a job; he went on an interview today     History:  Denies    Social History:  Born: [place], [place]  Marriage status: Never , Currently single  Children: None  Lives with: The patient's currently household consists of the patient, mother, stepfather    Substance Use History:  Denies    Abuse History:  Psychological: Denies  Physical: Denies  Sexual: Denies  Other: Denies    Patient's Support Network Includes:  mother and friends        The following portions of the patient's history were reviewed and updated as appropriate: allergies, current medications, past family history, past medical history, past social history, past surgical history and problem list.          Past Medical History:  Past Medical History:   Diagnosis Date    ADHD     Depression     OCD (obsessive compulsive disorder)        Social History:  Social History     Socioeconomic History    Marital status: Single   Tobacco Use    Smoking status: Never     Passive exposure: Never    Smokeless tobacco: Never    Tobacco comments:     None   Vaping Use    Vaping status: Never Used   Substance and Sexual Activity    Alcohol use: Yes     Comment: socially    Drug use: Never    Sexual activity: Defer       Family History:  Family History   Problem Relation Age of Onset    Diabetes Mother         Brother and mother have T1D    Diabetes Brother     Anxiety disorder Maternal Aunt     Diabetes Maternal Aunt     Depression Paternal Aunt     Diabetes Cousin        Past Surgical History:  Past Surgical History:   Procedure Laterality Date     CLOSED REDUCTION ELBOW DISLOCATION Bilateral     DENTAL PROCEDURE         Problem List:  Patient Active Problem List   Diagnosis    Anxiety disorder    Attention deficit hyperactivity disorder, predominantly inattentive type    History of OCD (obsessive compulsive disorder)    Encounter for annual physical exam    Testicular discomfort    Mold exposure    Alopecia, male pattern    Acute otitis externa of right ear       Allergy:   Allergies   Allergen Reactions    Sulfa Antibiotics         Current Medications:   Current Outpatient Medications   Medication Sig Dispense Refill    finasteride (PROPECIA) 1 MG tablet Take 1 tablet by mouth Daily. 90 tablet 3    sertraline (Zoloft) 100 MG tablet Take 1 tablet by mouth Daily. 90 tablet 1    Sodium Fluoride 5000 Sensitive 1.1-5 % gel       Acetylcysteine (NAC) capsule capsule Take 1 capsule by mouth 2 (Two) Times a Day.       No current facility-administered medications for this visit.       Review of Symptoms:    Review of Systems   Constitutional:  Positive for appetite change.   Psychiatric/Behavioral:  Positive for sleep disturbance, depressed mood and stress. The patient is nervous/anxious.          Physical Exam:   Due to the remote nature of this encounter (virtual encounter), vitals were unable to be obtained.  Height stated at 71 inches.  Weight stated at 216 pounds.      Physical Exam  Neurological:      Mental Status: He is alert.   Psychiatric:         Attention and Perception: Perception normal. He does not perceive auditory or visual hallucinations.         Mood and Affect: Mood is anxious.         Speech: Speech normal.         Behavior: Behavior normal. Behavior is cooperative.         Thought Content: Thought content is not paranoid or delusional. Thought content does not include homicidal or suicidal ideation. Thought content does not include homicidal or suicidal plan.         Cognition and Memory: Cognition and memory normal.      Comments: Distracted at  times. Restricted affect           Mental Status Exam:   Hygiene:   good  Cooperation:  Cooperative  Eye Contact:  Fair  Psychomotor Behavior:  Appropriate  Affect:  Restricted  Mood: anxious  Speech:  Normal  Thought Process:  Goal directed  Thought Content:  Mood congruent  Suicidal:  None  Homicidal:  None  Hallucinations:  None  Delusion:  None  Memory:  Intact  Orientation:  Person, Place, Time, and Situation  Reliability:  good  Insight:  Fair  Judgement:  Fair  Impulse Control:  Fair        Patient Health Questionnaire-9 (PHQ-9) (Depression Screening Tool)  Little interest or pleasure in doing things? 1-->several days   Feeling down, depressed, or hopeless? 2-->more than half the days   Trouble falling or staying asleep, or sleeping too much? 3-->nearly every day   Feeling tired or having little energy? 0-->not at all   Poor appetite or overeating? 1-->several days   Feeling bad about yourself - or that you are a failure or have let yourself or your family down? 0-->not at all   Trouble concentrating on things, such as reading the newspaper or watching television? 1-->several days   Moving or speaking so slowly that other people could have noticed? Or the opposite - being so fidgety or restless that you have been moving around a lot more than usual? 0-->not at all   Thoughts that you would be better off dead, or of hurting yourself in some way? 0-->not at all   PHQ-9 Total Score 8   If you checked off any problems, how difficult have these problems made it for you to do your work, take care of things at home, or get along with other people? somewhat difficult     PHQ-9 Total Score: 8    Patient screened positive for depression based on a PHQ-9 score of 8 on 8/15/2024. Follow-up recommendations include:  Pt did not wish to change his medication and wanted to follow up with PCP .    ROSEMARY request number [id number] reviewed by this APRN at today's encounter.    Previous Provider notes and available records  reviewed by saleem DUMONT at today's encounter.       Lab Results:   No visits with results within 1 Month(s) from this visit.   Latest known visit with results is:   Office Visit on 08/17/2023   Component Date Value Ref Range Status    Report Summary 08/17/2023 FINAL   Final    Comment: ====================================================================  [facility]  ====================================================================  Test                             Result       Flag       Units  Drug Present    Ibuprofen                      PRESENT  ====================================================================  Test                      Result    Flag   Units      Ref Range    Creatinine              165              mg/dL      >=20  ====================================================================  Declared Medications:   Medication list was not provided.  ====================================================================  For clinical consultation, please call [phone].  ====================================================================           Assessment & Plan   Problems Addressed this Visit       Anxiety disorder     Other Visit Diagnoses       Mixed obsessional thoughts and acts  (Chronic)   -  Primary    Mild episode of recurrent major depressive disorder  (Chronic)             Diagnoses         Codes Comments    Mixed obsessional thoughts and acts    -  Primary ICD-10-CM: F42.2  ICD-9-CM: 300.3     Generalized anxiety disorder     ICD-10-CM: F41.1  ICD-9-CM: 300.02     Mild episode of recurrent major depressive disorder     ICD-10-CM: F33.0  ICD-9-CM: 296.31             Visit Diagnoses:    ICD-10-CM ICD-9-CM   1. Mixed obsessional thoughts and acts  F42.2 300.3   2. Generalized anxiety disorder  F41.1 300.02   3. Mild episode of recurrent major depressive disorder  F33.0 296.31          GOALS:  Short Term Goals: Patient will be compliant with medication, and  patient will have no significant medication related side effects.  Patient will be engaged in psychotherapy as indicated.  Patient will report subjective improvement of symptoms.  Long term goals: To stabilize mood and treat/improve subjective symptoms, the patient will stay out of the hospital, the patient will be at an optimal level of functioning, and the patient will take all medications as prescribed.  The patient verbalized understanding and agreement with goals that were mutually set.      TREATMENT PLAN: Medication and treatment options, both pharmacological and non-pharmacological treatment options, discussed during today's visit, including any off label use of medication.     -This APRN discussed treatment options with pt, including therapy and medication. Pt has never found success with therapy and declined. Pt ultimately decided that he wanted to stay on the Zoloft and follow up with his PCP for medication management. Pt stated that he fears side effects if he tries another medication.       SUICIDE RISK ASSESSMENT AND SAFETY PLAN: Unalterable demographics and a history of mental health intervention indicate this patient is in a high risk category compared to the general population. At present, the patient denies active SI/HI, intentions, or plans at this time and agrees to seek immediate care should such thoughts develop. The patient verbalizes understanding of how to access emergency care if needed and agrees to do so. Consideration of suicide risk and protective factors such as history, current presentation, individual strengths and weaknesses, psychosocial and environmental stressors and variables, psychiatric illness and symptoms, medical conditions and pain, took place in this interview. Based on those considerations, the patient is determined: within individual baseline and presenting no imminent risk for suicide or homicide. Other recommendations: The patient does not meet the criteria for  inpatient admission and is not a safety risk to self or others at today's visit. Inpatient treatment offers no significant advantages over outpatient treatment for this patient at today's visit.  The patient was given ample time for questions and fully participated in treatment planning.  The patient was encouraged to call the clinic with any questions or concerns.  The patient was informed of access to emergency care. If patient were to develop any significant symptomatology, suicidal ideation, homicidal ideation, any concerns, or feel unsafe at any time they are to call the clinic and if unable to get immediate assistance should immediately call 911 or go to the nearest emergency room.  Patient contracted verbally for the following: If you are experiencing an emotional crisis or have thoughts of harming yourself or others, please go to your nearest local emergency room or call 911. Will continue to re-assess medication response and side effects frequently to establish efficacy and ensure safety. Risks, any black box warnings, side effects, off label usage, and benefits of medication and treatment discussed with patient, along with potential adverse side effects of current and/or newly prescribed medication, alternative treatment options, and OTC medications.  Patient verbalized understanding of potential risks, any off label use of medication, any black box warnings, and any side effects in their own words. The patient verbalized understanding and agreed to comply with the safety plan discussed in their own words.  Patient given the number to the office. Number also discussed of the 24- hour suicide hotline.       MEDS ORDERED DURING VISIT:  No orders of the defined types were placed in this encounter.          This document has been electronically signed by TIM Arreola  August 15, 2024 11:42 EDT    Some of the data in this electronic note has been brought forward from a previous encounter, any necessary  changes have been made, it has been reviewed by this APRN, and it is accurate.    Please note that portions of this note were completed with a voice recognition program. Efforts were made to edit dictation, but occasionally words are mistranscribed.

## 2024-08-30 ENCOUNTER — TELEMEDICINE (OUTPATIENT)
Dept: FAMILY MEDICINE CLINIC | Facility: CLINIC | Age: 29
End: 2024-08-30
Payer: COMMERCIAL

## 2024-08-30 DIAGNOSIS — U07.1 COVID-19 VIRUS INFECTION: Primary | ICD-10-CM

## 2024-08-30 PROCEDURE — 99213 OFFICE O/P EST LOW 20 MIN: CPT | Performed by: FAMILY MEDICINE

## 2024-08-30 PROCEDURE — 1125F AMNT PAIN NOTED PAIN PRSNT: CPT | Performed by: FAMILY MEDICINE

## 2024-08-30 RX ORDER — BROMPHENIRAMINE MALEATE, PSEUDOEPHEDRINE HYDROCHLORIDE, AND DEXTROMETHORPHAN HYDROBROMIDE 2; 30; 10 MG/5ML; MG/5ML; MG/5ML
5 SYRUP ORAL 4 TIMES DAILY PRN
Qty: 118 ML | Refills: 0 | Status: SHIPPED | OUTPATIENT
Start: 2024-08-30

## 2024-08-30 NOTE — PROGRESS NOTES
Telehealth E-Visit      Date: 2024   Patient Name: Yakelin Enrique  : 1995   MRN: [id number]     Chief Complaint:  No chief complaint on file.      I have reviewed the E-Visit questionnaire and the patient's answers, my assessment and plan are listed below.     This provider is located at the [facility] Location (through [facility]) using a secure [employer] Video Visit through [employer]. Patient is being seen remotely via telehealth at their home address in [place], and stated they are in a secure environment for this session. The patient's condition being diagnosed/treated is appropriate for telemedicine. The provider identified herself as well as her credentials. The patient, and/or patients guardian, consent to be seen remotely, and when consent is given they understand that the consent allows for patient identifiable information to be sent to a third party as needed. They may refuse to be seen remotely at any time. The electronic data is encrypted and password protected, and the patient and/or guardian has been advised of the potential risks to privacy not withstanding such measures.    You have chosen to receive care through a telehealth visit. Do you consent to use a video/audio connection for your medical care today? Yes    History of Present Illness: Yakelin Enrique is a 29 y.o. male who is here today with COVID, he states that his mother was on a recent cruise and came home with symptoms. He is mainly having mild symptoms with headache, cough, congestion. He tested positive for covid this morning with a home test and his symptoms started 2-3 days ago       Subjective      Review of Systems:   Review of Systems   Constitutional:  Positive for chills. Negative for fever.   HENT:  Positive for postnasal drip and rhinorrhea.    Eyes:  Positive for itching.   Respiratory:  Positive for cough.    Neurological:  Positive for headache.       I have reviewed and the following portions of  the patient's history were updated as appropriate: past family history, past medical history, past social history, past surgical history and problem list.    Medications:     Current Outpatient Medications:     Acetylcysteine (NAC) capsule capsule, Take 1 capsule by mouth 2 (Two) Times a Day., Disp: , Rfl:     brompheniramine-pseudoephedrine-DM 30-2-10 MG/5ML syrup, Take 5 mL by mouth 4 (Four) Times a Day As Needed for Cough., Disp: 118 mL, Rfl: 0    finasteride (PROPECIA) 1 MG tablet, Take 1 tablet by mouth Daily., Disp: 90 tablet, Rfl: 3    Nirmatrelvir & Ritonavir, 300mg/100mg, (PAXLOVID), Take 3 tablets by mouth 2 (Two) Times a Day for 5 days., Disp: 30 tablet, Rfl: 0    sertraline (Zoloft) 100 MG tablet, Take 1 tablet by mouth Daily., Disp: 90 tablet, Rfl: 1    Sodium Fluoride 5000 Sensitive 1.1-5 % gel, , Disp: , Rfl:     Allergies:   Allergies   Allergen Reactions    Sulfa Antibiotics        Objective     Physical Exam:  Vital Signs: There were no vitals filed for this visit.  There is no height or weight on file to calculate BMI.    Physical Exam  Pulmonary:      Effort: Pulmonary effort is normal. No respiratory distress.   Neurological:      Mental Status: He is alert. Mental status is at baseline.           Assessment / Plan      Assessment/Plan:   Diagnoses and all orders for this visit:    1. COVID-19 virus infection (Primary)  -     Nirmatrelvir & Ritonavir, 300mg/100mg, (PAXLOVID); Take 3 tablets by mouth 2 (Two) Times a Day for 5 days.  Dispense: 30 tablet; Refill: 0  -     brompheniramine-pseudoephedrine-DM 30-2-10 MG/5ML syrup; Take 5 mL by mouth 4 (Four) Times a Day As Needed for Cough.  Dispense: 118 mL; Refill: 0           Managing Coronavirus (Covid-19) Symptoms at home  Signs and Symptoms of Covid-19 may range from a mild case of sniffles to severe disease requiring hospitalization. The majority of patients will only experience a mild illness and recover well at home with some care. Treatment  is aimed at symptom control - rest, fluid intake, fever reduction, cough suppression and pain relief.   Stay home and isolate yourself from family members. Use delivery services to have groceries and medications delivered or ask friends to drop-off groceries at your doorstep. You may want to have friends or family take your pets for a few days.   Monitor yourself: Take your temperature twice a day. If you own a pulse oximeter, make sure you know how to use it properly. Your pulse oximeter should read more than 92%.  Get plenty of rest, stay hydrated and use over the counter medications as necessary. Ask your doctor about the doses for these medications.     Hydration   Good hydration can alleviate many of the symptoms. Drink plenty of water or sports drinks, if you have a dry cough, a teaspoon of honey in hot water can help soothe your throat. If you have congestion, a warm, non-caffeinated beverage or warm shower can help loosen mucus.    WHO Oral Rehydration Solution:  3/8 tsp salt (sodium chloride)  1/4 tsp [place] Salt Substitute (potassium chloride)  1/2 tsp baking soda (sodium bicarbonate)  2 tablespoon + 2 teaspoon sugar  Add tap water to make 1 liter   Optional: May add NutraSweet or Splenda for flavor, Crystal light or sugar free Jello also works.   Do not use red or purple colored Jello or Crystal light.     Over the counter medications:   Acetaminophen (Tylenol): reduces fever and pain - body aches, sore throat etc.  Guaifenesin (Mucinex) - expectorant, reduces chest congestion, helps bring up mucous.   Dextromethorphan (Mucinex-DM or Delsym) - cough suppressant  Afrin - nasal decongestant. Helps open up a stuffy nose. Be careful not to use this medication more than twice a day for no more than three days in a row, or your symptoms may get worse.     Prescription medications:   Sometimes you will need prescription medications to manage your symptoms. Here are some common ones that are used.   Ondansetron  (Zofran) - used to prevent nausea or vomiting. It comes in a pill and an under the tongue formulation.   Promethazine (Phenergan) and Prochlorperazine (Compazine) - also used to treat nausea and vomiting. Both of these are available as tablets and suppositories  Benzonatate (Tessalon) - cough suppressant. It is important that you take this capsule whole and not crush or chew it.     How to avoid infecting other members of your household:  Avoid contact with members of your household, including pets.  Stay home from work, school and public areas unless it's to get medical care.  Avoid using public transportation, ride-sharing services or taxis.  Stay isolated in one room, away from your family and other people, as much as possible. This includes eating in your room. Open windows to keep air circulating. Use a separate bathroom, if possible.  Avoid shared spaces in your home as much as possible. When using shared spaces, limit your movements and wear a mask. Keep your kitchen and other shared spaces well ventilated. Stay at least 6 feet (2 meters) away from your family members.  Clean often-touched surfaces in your separate room and bathroom, such as doorknobs, light switches, electronics and counters, every day.  Avoid sharing personal household items such as dishes, towels, bedding and electronics.  Wear a face mask when near others. Change the face mask each day.  If wearing a face mask isn't possible, cover your mouth and nose with a tissue or elbow when coughing or sneezing. Afterward, throw away the tissue or wash the handkerchief.  Frequently wash your hands with soap and water for at least 20 seconds, or use an alcohol-based hand  that contains at least 60% alcohol.    Signs that should warrant an evaluation at a higher level of care such as a local emergency room:  Trouble breathing when you are resting or walking short distances (such as from your bedroom to bathroom).  Your pulse oximeter reading  is below 92% and does not improve with slow deep breathing and lying on your stomach.  A fever of 102 F(38.9 C) or higher that lasts for 24 hours and does not get better after you take acetaminophen (Tylenol).  Persistent chest pain or pressure.  Blood in your sputum.  A very bad headache that will not improve or go away with Tylenol.  New confusion.  Bluish lips or face.  Inability to stay awake.  Pale gray or blue-colored skin, lips or nail beds (depends on skin tone).    Isolation period: Isolation period of at least 5 days if symptoms are improving.     Note:   If you are immunosuppressed due to cancer, chemotherapy or use of certain medications, please discuss specific guidelines with your physician/physician assistant/nurse practitioner.      I discussed that Paxlovid is an EUA medication.  I discussed indications and benefits of the reduced risk of hospitalization for this medication.  I discussed the risk of nausea and rebound congestion.  Patient is candidate due to high risk of hospitalization. She consents to medication use.     Patient counseled to seek care for worsening dyspnea, new substernal CP or sharp CP, o2 <94 or worsening symptoms.       Follow Up:   No follow-ups on file.    Any medications prescribed have been sent electronically to   [facility]/pharmacy #[id number] - [place], [place] - [address] [place] [address] - [phone]  - [phone]   [address] [facility] [address]  Phone: [phone] Fax: [phone]      This document has been electronically signed by Marija Corral DO   August 30, 2024 12:20 EDT    Dictated Utilizing Dragon Dictation: Part of this note may be an electronic transcription/translation of spoken language to printed text using the Dragon Dictation System.    Marija Corral D.O.  [facility] [place]

## 2025-03-20 DIAGNOSIS — F41.1 GENERALIZED ANXIETY DISORDER: ICD-10-CM

## 2025-04-18 DIAGNOSIS — L64.9 ALOPECIA, MALE PATTERN: ICD-10-CM

## 2025-04-18 RX ORDER — FINASTERIDE 1 MG/1
1 TABLET, FILM COATED ORAL DAILY
Qty: 90 TABLET | Refills: 3 | OUTPATIENT
Start: 2025-04-18